# Patient Record
Sex: FEMALE | Race: WHITE | NOT HISPANIC OR LATINO | Employment: OTHER | URBAN - METROPOLITAN AREA
[De-identification: names, ages, dates, MRNs, and addresses within clinical notes are randomized per-mention and may not be internally consistent; named-entity substitution may affect disease eponyms.]

---

## 2017-01-27 ENCOUNTER — COMMUNICATION - HEALTHEAST (OUTPATIENT)
Dept: FAMILY MEDICINE | Facility: CLINIC | Age: 82
End: 2017-01-27

## 2017-03-03 ENCOUNTER — RECORDS - HEALTHEAST (OUTPATIENT)
Dept: ADMINISTRATIVE | Facility: OTHER | Age: 82
End: 2017-03-03

## 2017-03-03 PROCEDURE — 99285 EMERGENCY DEPT VISIT HI MDM: CPT | Mod: 25 | Performed by: STUDENT IN AN ORGANIZED HEALTH CARE EDUCATION/TRAINING PROGRAM

## 2017-03-03 PROCEDURE — 96361 HYDRATE IV INFUSION ADD-ON: CPT

## 2017-03-03 PROCEDURE — 96360 HYDRATION IV INFUSION INIT: CPT

## 2017-03-03 PROCEDURE — 93005 ELECTROCARDIOGRAM TRACING: CPT

## 2017-03-03 PROCEDURE — 99285 EMERGENCY DEPT VISIT HI MDM: CPT

## 2017-03-03 PROCEDURE — 93010 ELECTROCARDIOGRAM REPORT: CPT | Performed by: STUDENT IN AN ORGANIZED HEALTH CARE EDUCATION/TRAINING PROGRAM

## 2017-03-04 ENCOUNTER — APPOINTMENT (OUTPATIENT)
Dept: GENERAL RADIOLOGY | Facility: CLINIC | Age: 82
End: 2017-03-04
Attending: STUDENT IN AN ORGANIZED HEALTH CARE EDUCATION/TRAINING PROGRAM
Payer: COMMERCIAL

## 2017-03-04 ENCOUNTER — HOSPITAL ENCOUNTER (EMERGENCY)
Facility: CLINIC | Age: 82
Discharge: HOME | End: 2017-03-04
Attending: STUDENT IN AN ORGANIZED HEALTH CARE EDUCATION/TRAINING PROGRAM | Admitting: STUDENT IN AN ORGANIZED HEALTH CARE EDUCATION/TRAINING PROGRAM
Payer: COMMERCIAL

## 2017-03-04 VITALS
BODY MASS INDEX: 24.84 KG/M2 | HEIGHT: 62 IN | TEMPERATURE: 97.4 F | HEART RATE: 75 BPM | WEIGHT: 135 LBS | OXYGEN SATURATION: 95 % | DIASTOLIC BLOOD PRESSURE: 66 MMHG | SYSTOLIC BLOOD PRESSURE: 125 MMHG | RESPIRATION RATE: 12 BRPM

## 2017-03-04 DIAGNOSIS — R61 DIAPHORESIS: ICD-10-CM

## 2017-03-04 DIAGNOSIS — M62.81 GENERALIZED MUSCLE WEAKNESS: ICD-10-CM

## 2017-03-04 LAB
ALBUMIN SERPL-MCNC: 3.8 G/DL (ref 3.4–5)
ALBUMIN UR-MCNC: NEGATIVE MG/DL
ALP SERPL-CCNC: 84 U/L (ref 40–150)
ALT SERPL W P-5'-P-CCNC: 27 U/L (ref 0–50)
ANION GAP SERPL CALCULATED.3IONS-SCNC: 9 MMOL/L (ref 3–14)
APPEARANCE UR: CLEAR
AST SERPL W P-5'-P-CCNC: 22 U/L (ref 0–45)
BASOPHILS # BLD AUTO: 0 10E9/L (ref 0–0.2)
BASOPHILS NFR BLD AUTO: 0.3 %
BILIRUB SERPL-MCNC: 0.4 MG/DL (ref 0.2–1.3)
BILIRUB UR QL STRIP: NEGATIVE
BUN SERPL-MCNC: 10 MG/DL (ref 7–30)
CALCIUM SERPL-MCNC: 8.3 MG/DL (ref 8.5–10.1)
CHLORIDE SERPL-SCNC: 93 MMOL/L (ref 94–109)
CO2 SERPL-SCNC: 26 MMOL/L (ref 20–32)
COLOR UR AUTO: ABNORMAL
CREAT SERPL-MCNC: 0.59 MG/DL (ref 0.52–1.04)
DIFFERENTIAL METHOD BLD: NORMAL
EOSINOPHIL # BLD AUTO: 0.2 10E9/L (ref 0–0.7)
EOSINOPHIL NFR BLD AUTO: 3.1 %
ERYTHROCYTE [DISTWIDTH] IN BLOOD BY AUTOMATED COUNT: 12.1 % (ref 10–15)
GFR SERPL CREATININE-BSD FRML MDRD: ABNORMAL ML/MIN/1.7M2
GLUCOSE SERPL-MCNC: 76 MG/DL (ref 70–99)
GLUCOSE UR STRIP-MCNC: NEGATIVE MG/DL
HCT VFR BLD AUTO: 37.8 % (ref 35–47)
HGB BLD-MCNC: 12.8 G/DL (ref 11.7–15.7)
HGB UR QL STRIP: NEGATIVE
IMM GRANULOCYTES # BLD: 0 10E9/L (ref 0–0.4)
IMM GRANULOCYTES NFR BLD: 0.2 %
KETONES UR STRIP-MCNC: NEGATIVE MG/DL
LACTATE BLD-SCNC: 1 MMOL/L (ref 0.7–2.1)
LACTATE BLD-SCNC: 3.2 MMOL/L (ref 0.7–2.1)
LEUKOCYTE ESTERASE UR QL STRIP: NEGATIVE
LYMPHOCYTES # BLD AUTO: 3.3 10E9/L (ref 0.8–5.3)
LYMPHOCYTES NFR BLD AUTO: 52 %
MCH RBC QN AUTO: 29.2 PG (ref 26.5–33)
MCHC RBC AUTO-ENTMCNC: 33.9 G/DL (ref 31.5–36.5)
MCV RBC AUTO: 86 FL (ref 78–100)
MONOCYTES # BLD AUTO: 0.8 10E9/L (ref 0–1.3)
MONOCYTES NFR BLD AUTO: 13.2 %
NEUTROPHILS # BLD AUTO: 2 10E9/L (ref 1.6–8.3)
NEUTROPHILS NFR BLD AUTO: 31.2 %
NITRATE UR QL: NEGATIVE
PH UR STRIP: 6.5 PH (ref 5–7)
PLATELET # BLD AUTO: 234 10E9/L (ref 150–450)
POTASSIUM SERPL-SCNC: 3.2 MMOL/L (ref 3.4–5.3)
PROT SERPL-MCNC: 7 G/DL (ref 6.8–8.8)
RBC # BLD AUTO: 4.39 10E12/L (ref 3.8–5.2)
SODIUM SERPL-SCNC: 128 MMOL/L (ref 133–144)
SP GR UR STRIP: 1 (ref 1–1.03)
T4 FREE SERPL-MCNC: 1.03 NG/DL (ref 0.76–1.46)
TROPONIN I SERPL-MCNC: NORMAL UG/L (ref 0–0.04)
TROPONIN I SERPL-MCNC: NORMAL UG/L (ref 0–0.04)
TSH SERPL DL<=0.005 MIU/L-ACNC: 4.14 MU/L (ref 0.4–4)
URN SPEC COLLECT METH UR: ABNORMAL
UROBILINOGEN UR STRIP-MCNC: NORMAL MG/DL (ref 0–2)
WBC # BLD AUTO: 6.4 10E9/L (ref 4–11)

## 2017-03-04 PROCEDURE — 96361 HYDRATE IV INFUSION ADD-ON: CPT

## 2017-03-04 PROCEDURE — 25000132 ZZH RX MED GY IP 250 OP 250 PS 637: Mod: GY | Performed by: STUDENT IN AN ORGANIZED HEALTH CARE EDUCATION/TRAINING PROGRAM

## 2017-03-04 PROCEDURE — 71020 XR CHEST 2 VW: CPT

## 2017-03-04 PROCEDURE — 85025 COMPLETE CBC W/AUTO DIFF WBC: CPT | Performed by: STUDENT IN AN ORGANIZED HEALTH CARE EDUCATION/TRAINING PROGRAM

## 2017-03-04 PROCEDURE — 81003 URINALYSIS AUTO W/O SCOPE: CPT | Performed by: STUDENT IN AN ORGANIZED HEALTH CARE EDUCATION/TRAINING PROGRAM

## 2017-03-04 PROCEDURE — 84443 ASSAY THYROID STIM HORMONE: CPT | Performed by: STUDENT IN AN ORGANIZED HEALTH CARE EDUCATION/TRAINING PROGRAM

## 2017-03-04 PROCEDURE — 84484 ASSAY OF TROPONIN QUANT: CPT | Performed by: STUDENT IN AN ORGANIZED HEALTH CARE EDUCATION/TRAINING PROGRAM

## 2017-03-04 PROCEDURE — 25000128 H RX IP 250 OP 636: Performed by: STUDENT IN AN ORGANIZED HEALTH CARE EDUCATION/TRAINING PROGRAM

## 2017-03-04 PROCEDURE — 87086 URINE CULTURE/COLONY COUNT: CPT | Performed by: STUDENT IN AN ORGANIZED HEALTH CARE EDUCATION/TRAINING PROGRAM

## 2017-03-04 PROCEDURE — 80053 COMPREHEN METABOLIC PANEL: CPT | Performed by: STUDENT IN AN ORGANIZED HEALTH CARE EDUCATION/TRAINING PROGRAM

## 2017-03-04 PROCEDURE — 96360 HYDRATION IV INFUSION INIT: CPT

## 2017-03-04 PROCEDURE — 84439 ASSAY OF FREE THYROXINE: CPT | Performed by: STUDENT IN AN ORGANIZED HEALTH CARE EDUCATION/TRAINING PROGRAM

## 2017-03-04 PROCEDURE — 83605 ASSAY OF LACTIC ACID: CPT | Performed by: STUDENT IN AN ORGANIZED HEALTH CARE EDUCATION/TRAINING PROGRAM

## 2017-03-04 PROCEDURE — A9270 NON-COVERED ITEM OR SERVICE: HCPCS | Mod: GY | Performed by: STUDENT IN AN ORGANIZED HEALTH CARE EDUCATION/TRAINING PROGRAM

## 2017-03-04 RX ORDER — LIDOCAINE 40 MG/G
CREAM TOPICAL
Status: DISCONTINUED | OUTPATIENT
Start: 2017-03-04 | End: 2017-03-04 | Stop reason: HOSPADM

## 2017-03-04 RX ORDER — POTASSIUM CHLORIDE 1.5 G/1.58G
40 POWDER, FOR SOLUTION ORAL ONCE
Status: COMPLETED | OUTPATIENT
Start: 2017-03-04 | End: 2017-03-04

## 2017-03-04 RX ORDER — ASPIRIN 81 MG/1
162 TABLET, CHEWABLE ORAL ONCE
Status: COMPLETED | OUTPATIENT
Start: 2017-03-04 | End: 2017-03-04

## 2017-03-04 RX ADMIN — ASPIRIN 81 MG 162 MG: 81 TABLET ORAL at 02:04

## 2017-03-04 RX ADMIN — POTASSIUM CHLORIDE 40 MEQ: 1.5 POWDER, FOR SOLUTION ORAL at 02:05

## 2017-03-04 RX ADMIN — SODIUM CHLORIDE: 9 INJECTION, SOLUTION INTRAVENOUS at 02:05

## 2017-03-04 RX ADMIN — SODIUM CHLORIDE 1000 ML: 9 INJECTION, SOLUTION INTRAVENOUS at 00:28

## 2017-03-04 NOTE — PROGRESS NOTES
DATE:  3/4/2017   TIME OF RECEIPT FROM LAB:  0049  LAB TEST:  Lactic  LAB VALUE:  3.2  RESULTS GIVEN WITH READ-BACK TO (PROVIDER): Dr. Vidal  TIME LAB VALUE REPORTED TO PROVIDER:   0049

## 2017-03-04 NOTE — ED AVS SNAPSHOT
[facility] Emergency Department    [address] [address]    Phone:  [phone]    Fax:  [phone]                                       Polina Fernandez   MRN: [id number]    Department:  [facility] Emergency Department   Date of Visit:  3/3/2017           Patient Information     Date Of Birth          1935        Your diagnoses for this visit were:     Generalized muscle weakness     Diaphoresis        You were seen by Kahlil Vidal DO.      Follow-up Information     Follow up with Usha Johnson Schedule an appointment as soon as possible for a visit in 3 days.    Specialty:  Family Practice    Why:  Followup for reevaluation and managment plan.    Contact information:    [address] [address] [phone]        Discharge References/Attachments     WEAKNESS (UNCERTAIN CAUSE) (ENGLISH)      24 Hour Appointment Hotline       To make an appointment at any [facility], call [phone] ([phone]). If you don't have a family doctor or clinic, we will help you find one. [facility] are conveniently located to serve the needs of you and your family.             Review of your medicines      Our records show that you are taking the medicines listed below. If these are incorrect, please call your family doctor or clinic.        Dose / Directions Last dose taken    EVISTA 60 MG tablet   Generic drug:  raloxifene        1 TABLET ORALLY DAILY   Refills:  0        HYDROcodone-acetaminophen 5-325 MG per tablet   Commonly known as:  NORCO   Dose:  1-2 tablet   Quantity:  15 tablet        Take 1-2 tablets by mouth every 4 hours as needed for pain   Refills:  0        lovastatin 20 MG tablet   Commonly known as:  MEVACOR        1 TABLET ORALLY DAILY AT DINNER   Refills:  0        SYNTHROID 50 MCG tablet   Generic drug:  levothyroxine        1 TABLET ORALLY DAILY   Refills:  0        ZOLOFT 100 MG tablet   Generic  drug:  sertraline        1 TABLET ORALLY DAILY   Refills:  0                Procedures and tests performed during your visit     Procedure/Test Number of Times Performed    CBC with platelets differential 1    Cardiac Continuous Monitoring 1    Comprehensive metabolic panel 1    EKG 12 lead 1    Lactic acid whole blood 2    Peripheral IV catheter 1    Peripheral IV: Standard 1    Pulse oximetry nursing 1    T4 free 1    TSH with free T4 reflex 1    Troponin I 1    Troponin I (second draw) 1    UA reflex to Microscopic 1    Urine Culture Aerobic Bacterial 1    Vital signs 1    XR Chest 2 Views 1      Orders Needing Specimen Collection     None      Pending Results     Date and Time Order Name Status Description    3/4/2017 0014 Urine Culture Aerobic Bacterial In process     3/4/2017 0014 XR Chest 2 Views Preliminary             Pending Culture Results     Date and Time Order Name Status Description    3/4/2017 0014 Urine Culture Aerobic Bacterial In process              Test Results from your hospital stay     3/4/2017  1:04 AM - Interface, Flexilab Results      Component Results     Component Value Ref Range & Units Status    Sodium 128 (L) 133 - 144 mmol/L Final    Potassium 3.2 (L) 3.4 - 5.3 mmol/L Final    Chloride 93 (L) 94 - 109 mmol/L Final    Carbon Dioxide 26 20 - 32 mmol/L Final    Anion Gap 9 3 - 14 mmol/L Final    Glucose 76 70 - 99 mg/dL Final    Urea Nitrogen 10 7 - 30 mg/dL Final    Creatinine 0.59 0.52 - 1.04 mg/dL Final    GFR Estimate >90  Non  GFR Calc   >60 mL/min/1.7m2 Final    GFR Estimate If Black >90   GFR Calc   >60 mL/min/1.7m2 Final    Calcium 8.3 (L) 8.5 - 10.1 mg/dL Final    Bilirubin Total 0.4 0.2 - 1.3 mg/dL Final    Albumin 3.8 3.4 - 5.0 g/dL Final    Protein Total 7.0 6.8 - 8.8 g/dL Final    Alkaline Phosphatase 84 40 - 150 U/L Final    ALT 27 0 - 50 U/L Final    AST 22 0 - 45 U/L Final         3/4/2017 12:46 AM - Interface, Flexilab Results       Component Results     Component Value Ref Range & Units Status    WBC 6.4 4.0 - 11.0 10e9/L Final    RBC Count 4.39 3.8 - 5.2 10e12/L Final    Hemoglobin 12.8 11.7 - 15.7 g/dL Final    Hematocrit 37.8 35.0 - 47.0 % Final    MCV 86 78 - 100 fl Final    MCH 29.2 26.5 - 33.0 pg Final    MCHC 33.9 31.5 - 36.5 g/dL Final    RDW 12.1 10.0 - 15.0 % Final    Platelet Count 234 150 - 450 10e9/L Final    Diff Method Automated Method  Final    % Neutrophils 31.2 % Final    % Lymphocytes 52.0 % Final    % Monocytes 13.2 % Final    % Eosinophils 3.1 % Final    % Basophils 0.3 % Final    % Immature Granulocytes 0.2 % Final    Absolute Neutrophil 2.0 1.6 - 8.3 10e9/L Final    Absolute Lymphocytes 3.3 0.8 - 5.3 10e9/L Final    Absolute Monocytes 0.8 0.0 - 1.3 10e9/L Final    Absolute Eosinophils 0.2 0.0 - 0.7 10e9/L Final    Absolute Basophils 0.0 0.0 - 0.2 10e9/L Final    Abs Immature Granulocytes 0.0 0 - 0.4 10e9/L Final         3/4/2017  1:04 AM - Interface, Flexilab Results      Component Results     Component Value Ref Range & Units Status    Troponin I ES  0.000 - 0.045 ug/L Final    <0.015  The 99th percentile for upper reference range is 0.045 ug/L.  Troponin values in   the range of 0.045 - 0.120 ug/L may be associated with risks of adverse   clinical events.           3/4/2017  1:19 AM - Interface, Radiant Ib      Narrative     CHEST TWO VIEWS  3/4/2017 12:50 AM     HISTORY: Chest pain.    COMPARISON: None.        Impression     IMPRESSION: Aortic calcification. Chest otherwise negative. Lungs are  clear.         3/4/2017  1:09 AM - Interface, Flexilab Results      Component Results     Component Value Ref Range & Units Status    TSH 4.14 (H) 0.40 - 4.00 mU/L Final         3/4/2017  1:19 AM - Interface, Flexilab Results         3/4/2017  1:25 AM - Interface, Flexilab Results      Component Results     Component Value Ref Range & Units Status    Color Urine Light Yellow  Final    Appearance Urine Clear  Final    Glucose  Urine Negative NEG mg/dL Final    Bilirubin Urine Negative NEG Final    Ketones Urine Negative NEG mg/dL Final    Specific Gravity Urine 1.001 (L) 1.003 - 1.035 Final    Blood Urine Negative NEG Final    pH Urine 6.5 5.0 - 7.0 pH Final    Protein Albumin Urine Negative NEG mg/dL Final    Urobilinogen mg/dL Normal 0.0 - 2.0 mg/dL Final    Nitrite Urine Negative NEG Final    Leukocyte Esterase Urine Negative NEG Final    Source Midstream Urine  Final         3/4/2017 12:49 AM - Interface, Flexilab Results      Component Results     Component Value Ref Range & Units Status    Lactic Acid 3.2 (H) 0.7 - 2.1 mmol/L Final    Significant value called to and read back by  RENETTA NAVA 3/4/17 AT 0048 BY WILLY           3/4/2017  1:22 AM - Interface, Flexilab Results      Component Results     Component Value Ref Range & Units Status    T4 Free 1.03 0.76 - 1.46 ng/dL Final         3/4/2017  3:23 AM - Interface, Flexilab Results      Component Results     Component Value Ref Range & Units Status    Troponin I ES  0.000 - 0.045 ug/L Final    <0.015  The 99th percentile for upper reference range is 0.045 ug/L.  Troponin values in   the range of 0.045 - 0.120 ug/L may be associated with risks of adverse   clinical events.           3/4/2017  3:09 AM - Interface, Flexilab Results      Component Results     Component Value Ref Range & Units Status    Lactic Acid 1.0 0.7 - 2.1 mmol/L Final                Thank you for choosing [facility]       Thank you for choosing [facility] for your care. Our goal is always to provide you with excellent care. Hearing back from our patients is one way we can continue to improve our services. Please take a few minutes to complete the written survey that you may receive in the mail after you visit with us. Thank you!        [employer] Information     [employer] lets you send messages to your doctor, view your test results, renew your prescriptions, schedule appointments and more. To sign up, go to  [email or web address] . Click on \"Log in\" on the left side of the screen, which will take you to the Welcome page. Then click on \"Sign up Now\" on the right side of the page.     You will be asked to enter the access code listed below, as well as some personal information. Please follow the directions to create your username and password.     Your access code is: [email or web address]  Expires: 2017  1:52 AM     Your access code will  in 90 days. If you need help or a new code, please call your [place] clinic or [phone].        Care EveryWhere ID     This is your Care EveryWhere ID. This could be used by other organizations to access your [place] medical records  [id number]        After Visit Summary       This is your record. Keep this with you and show to your community pharmacist(s) and doctor(s) at your next visit.                  "

## 2017-03-04 NOTE — ED NOTES
Patient  Presents after working very hard trying to get her house ready for sell Patient lives and cares for her son who is handicapped  Patient has Mycenaean . Patient seems to be very dehydrated she has not been taking fluids while she was working all day

## 2017-03-04 NOTE — ED AVS SNAPSHOT
[facility] Emergency Department    [address] [address]    Phone:  [phone]    Fax:  [phone]                                       Polina Fernandez   MRN: [id number]    Department:  [facility] Emergency Department   Date of Visit:  3/3/2017           After Visit Summary Signature Page     I have received my discharge instructions, and my questions have been answered. I have discussed any challenges I see with this plan with the nurse or doctor.    ..........................................................................................................................................  Patient/Patient Representative Signature      ..........................................................................................................................................  Patient Representative Print Name and Relationship to Patient    ..................................................               ................................................  Date                                            Time    ..........................................................................................................................................  Reviewed by Signature/Title    ...................................................              ..............................................  Date                                                            Time

## 2017-03-04 NOTE — ED PROVIDER NOTES
History     Chief Complaint   Patient presents with     Fatigue     HPI  Polina Fernandez is a 82 year old female with past medical history which include hypertension and osteoarthritis who presents for evaluation of episode of generalized weakness and diaphoresis at 11 PM. Patient explains that she was quite active throughout the day but feeling well, she was sitting in front of the television and playing on her computer for a couple hours prior to sudden onset of diaphoresis and the generalized weakness. She admits that the sweating resolved when she got into her car to come to the department, but in the department she still feels that she is suffering from generalized weakness. She denies recent fevers/chills, chest pain, shortness of breath, cough, hemoptysis, abdominal pain, vomiting, diarrhea, or genitourinary symptoms.    I have reviewed the Medications, Allergies, Past Medical and Surgical History, and Social History in the Epic system.    There is no problem list on file for this patient.      No past surgical history on file.    Social History     Social History     Marital status:      Spouse name: N/A     Number of children: N/A     Years of education: N/A     Occupational History     Not on file.     Social History Main Topics     Smoking status: Never Smoker     Smokeless tobacco: Not on file     Alcohol use No     Drug use: No     Sexual activity: Not on file     Other Topics Concern     Not on file     Social History Narrative       No family history on file.    Most Recent Immunizations   Administered Date(s) Administered     TDAP (ADACEL AGES 11-64) 12/18/2015       Review of Systems  Constitutional: Positive for sudden onset of generalized weakness with diaphoresis. Negative for fever or chills.  Eye: Negative for visual changes from baseline.  Respiratory: Negative for cough or shortness of breath.  Cardiovascular: Negative for chest pain.  Gastrointestinal: Negative for abdominal  "pain, vomiting, or diarrhea. Denies blood with bowel movements.  Genitourinary: Negative for dysuria.  Musculoskeletal: Negative for back pain or recent injuries.  Neurological: Negative for headache, dizziness, sensory deficits or focal weakness.    All others reviewed and are negative.      Physical Exam   BP: 151/80  Pulse: 75  Temp: 97.4  F (36.3  C)  Resp: 18  Height: 157.5 cm (5' 2\")  Weight: 61.2 kg (135 lb)  SpO2: 99 %  Physical Exam  Constitutional: Well developed, well nourished. Appears nontoxic and in no acute distress.   Head: Normocephalic and atraumatic. Symmetrical in appearance.  Eyes: Conjunctivae are normal.  Neck: Neck supple.  Cardiovascular: No cyanosis. RRR. No audible murmurs noted. No lower extremity edema or asymmetry.  Respiratory: Effort normal, no respiratory distress. CTAB without diminished regions. No wheezing, rhonchi, or crackles.  Gastrointestinal: Soft, nondistended abdomen. Nontender and without guarding. No rigidity or rebound tenderness. Negative McBurney's point. Negative for Hanks's sign. No palpable pulsatile mass.  Musculoskeletal: Moves all extremities spontaneously and without complaint.  Neuro: Patient is alert and oriented.  Skin: Skin is warm and dry, not diaphoretic.  Psych: Appears to have a normal mood and affect.      ED Course     ED Course     Procedures               EKG Interpretation:      Interpreted by: Kahlil Vidal  Time reviewed: Upon arrival     Symptoms at time of EKG: Mild generalized weakness  Rhythm: Sinus  Rate: Normal  Conduction: None atypical   ST Segments/ T Waves: No pathologic ST-elevations or T-wave abnormalities.  Q Waves: None  Comparison to prior: Similar morphology to previous     Clinical Impression: No sign of ischemia         Critical Care time:  none               Results for orders placed or performed during the hospital encounter of 03/04/17 (from the past 24 hour(s))   Comprehensive metabolic panel   Result Value Ref Range    " Sodium 128 (L) 133 - 144 mmol/L    Potassium 3.2 (L) 3.4 - 5.3 mmol/L    Chloride 93 (L) 94 - 109 mmol/L    Carbon Dioxide 26 20 - 32 mmol/L    Anion Gap 9 3 - 14 mmol/L    Glucose 76 70 - 99 mg/dL    Urea Nitrogen 10 7 - 30 mg/dL    Creatinine 0.59 0.52 - 1.04 mg/dL    GFR Estimate >90  Non  GFR Calc   >60 mL/min/1.7m2    GFR Estimate If Black >90   GFR Calc   >60 mL/min/1.7m2    Calcium 8.3 (L) 8.5 - 10.1 mg/dL    Bilirubin Total 0.4 0.2 - 1.3 mg/dL    Albumin 3.8 3.4 - 5.0 g/dL    Protein Total 7.0 6.8 - 8.8 g/dL    Alkaline Phosphatase 84 40 - 150 U/L    ALT 27 0 - 50 U/L    AST 22 0 - 45 U/L   CBC with platelets differential   Result Value Ref Range    WBC 6.4 4.0 - 11.0 10e9/L    RBC Count 4.39 3.8 - 5.2 10e12/L    Hemoglobin 12.8 11.7 - 15.7 g/dL    Hematocrit 37.8 35.0 - 47.0 %    MCV 86 78 - 100 fl    MCH 29.2 26.5 - 33.0 pg    MCHC 33.9 31.5 - 36.5 g/dL    RDW 12.1 10.0 - 15.0 %    Platelet Count 234 150 - 450 10e9/L    Diff Method Automated Method     % Neutrophils 31.2 %    % Lymphocytes 52.0 %    % Monocytes 13.2 %    % Eosinophils 3.1 %    % Basophils 0.3 %    % Immature Granulocytes 0.2 %    Absolute Neutrophil 2.0 1.6 - 8.3 10e9/L    Absolute Lymphocytes 3.3 0.8 - 5.3 10e9/L    Absolute Monocytes 0.8 0.0 - 1.3 10e9/L    Absolute Eosinophils 0.2 0.0 - 0.7 10e9/L    Absolute Basophils 0.0 0.0 - 0.2 10e9/L    Abs Immature Granulocytes 0.0 0 - 0.4 10e9/L   Troponin I   Result Value Ref Range    Troponin I ES  0.000 - 0.045 ug/L     <0.015  The 99th percentile for upper reference range is 0.045 ug/L.  Troponin values in   the range of 0.045 - 0.120 ug/L may be associated with risks of adverse   clinical events.     TSH with free T4 reflex   Result Value Ref Range    TSH 4.14 (H) 0.40 - 4.00 mU/L   Lactic acid whole blood   Result Value Ref Range    Lactic Acid 3.2 (H) 0.7 - 2.1 mmol/L   T4 free   Result Value Ref Range    T4 Free 1.03 0.76 - 1.46 ng/dL   XR Chest 2 Views     "Narrative    CHEST TWO VIEWS  3/4/2017 12:50 AM     HISTORY: Chest pain.    COMPARISON: None.      Impression    IMPRESSION: Aortic calcification. Chest otherwise negative. Lungs are  clear.   UA reflex to Microscopic   Result Value Ref Range    Color Urine Light Yellow     Appearance Urine Clear     Glucose Urine Negative NEG mg/dL    Bilirubin Urine Negative NEG    Ketones Urine Negative NEG mg/dL    Specific Gravity Urine 1.001 (L) 1.003 - 1.035    Blood Urine Negative NEG    pH Urine 6.5 5.0 - 7.0 pH    Protein Albumin Urine Negative NEG mg/dL    Urobilinogen mg/dL Normal 0.0 - 2.0 mg/dL    Nitrite Urine Negative NEG    Leukocyte Esterase Urine Negative NEG    Source Midstream Urine    Troponin I (second draw)   Result Value Ref Range    Troponin I ES  0.000 - 0.045 ug/L     <0.015  The 99th percentile for upper reference range is 0.045 ug/L.  Troponin values in   the range of 0.045 - 0.120 ug/L may be associated with risks of adverse   clinical events.     Lactic acid whole blood   Result Value Ref Range    Lactic Acid 1.0 0.7 - 2.1 mmol/L         Assessments & Plan (with Medical Decision Making)   Polina Fernandez is a 82 year old female who presents to the department for evaluation after episode of generalized weakness and diaphoresis while sitting in her \"television room\". She had no chest pain today, EKG morphology similar to previous, and troponin within reference range ×2. She is also without signs/symptoms of infectious process. CBC without leukocytosis or anemia. Her symptoms steadily improved during stay in the department with IV fluid hydration. She admits that she has not been drinking fluid as much as she should lately and spent a significant amount of time and energy cleaning her house earlier in the day. Nurse had initially put orders in including lactate which was elevated but improved during stay. Her symptoms were not associated with lightheadedness or near syncope she says.    Clinical " impression is the patient had an episode of diaphoresis and generalized weakness which readily improve, however etiology remains unknown. She was monitored in the department for >4 hours without recurrence or identifiable arrhythmia via cardiac monitor. Recommend continued monitoring at home as well as scheduling follow-up with her primary provider for reevaluation. Prior to discharge, I made it clear that illness can unexpectedly develop/progress so she has been instructed to return to the emergency department for reevaluation of evolving symptoms, chest pain, shortness of breath, lightheadedness, or other concerns. She seems comfortable with the discharge plan we discussed including follow up.    Disclaimer: This note consists of symbols derived from keyboarding, dictation, and/or voice recognition software. As a result, there may be errors in the script that have gone undetected.  Please consider this when interpreting information found in the chart.        I have reviewed the nursing notes.    I have reviewed the findings, diagnosis, plan and need for follow up with the patient.    New Prescriptions    No medications on file       Final diagnoses:   Generalized muscle weakness   Diaphoresis       3/3/2017   [facility] EMERGENCY DEPARTMENT     Kahlil Vidal DO  03/04/17 0340

## 2017-03-06 LAB
BACTERIA SPEC CULT: NORMAL
MICRO REPORT STATUS: NORMAL
SPECIMEN SOURCE: NORMAL

## 2017-08-21 ENCOUNTER — COMMUNICATION - HEALTHEAST (OUTPATIENT)
Dept: FAMILY MEDICINE | Facility: CLINIC | Age: 82
End: 2017-08-21

## 2017-08-21 DIAGNOSIS — I10 HTN (HYPERTENSION): ICD-10-CM

## 2017-08-23 ENCOUNTER — AMBULATORY - HEALTHEAST (OUTPATIENT)
Dept: FAMILY MEDICINE | Facility: CLINIC | Age: 82
End: 2017-08-23

## 2017-09-18 ENCOUNTER — OFFICE VISIT - HEALTHEAST (OUTPATIENT)
Dept: FAMILY MEDICINE | Facility: CLINIC | Age: 82
End: 2017-09-18

## 2017-09-18 ENCOUNTER — COMMUNICATION - HEALTHEAST (OUTPATIENT)
Dept: FAMILY MEDICINE | Facility: CLINIC | Age: 82
End: 2017-09-18

## 2017-09-18 DIAGNOSIS — M94.9 DISORDER OF BONE AND CARTILAGE: ICD-10-CM

## 2017-09-18 DIAGNOSIS — F34.1 DYSTHYMIC DISORDER: ICD-10-CM

## 2017-09-18 DIAGNOSIS — I10 HTN (HYPERTENSION): ICD-10-CM

## 2017-09-18 DIAGNOSIS — E03.9 HYPOTHYROID: ICD-10-CM

## 2017-09-18 DIAGNOSIS — Z00.00 HEALTH CARE MAINTENANCE: ICD-10-CM

## 2017-09-18 DIAGNOSIS — M77.42 METATARSALGIA OF BOTH FEET: ICD-10-CM

## 2017-09-18 DIAGNOSIS — M77.41 METATARSALGIA OF BOTH FEET: ICD-10-CM

## 2017-09-18 DIAGNOSIS — M89.9 DISORDER OF BONE AND CARTILAGE: ICD-10-CM

## 2017-09-18 DIAGNOSIS — E78.00 HYPERCHOLESTEREMIA: ICD-10-CM

## 2017-09-18 LAB
CHOLEST SERPL-MCNC: 195 MG/DL
FASTING STATUS PATIENT QL REPORTED: YES
HDLC SERPL-MCNC: 67 MG/DL
LDLC SERPL CALC-MCNC: 115 MG/DL
TRIGL SERPL-MCNC: 64 MG/DL

## 2017-09-18 ASSESSMENT — MIFFLIN-ST. JEOR: SCORE: 1006.17

## 2017-10-09 ENCOUNTER — AMBULATORY - HEALTHEAST (OUTPATIENT)
Dept: NURSING | Facility: CLINIC | Age: 82
End: 2017-10-09

## 2017-10-09 DIAGNOSIS — I10 ESSENTIAL HYPERTENSION: ICD-10-CM

## 2017-10-25 ENCOUNTER — HOSPITAL ENCOUNTER (OUTPATIENT)
Dept: MAMMOGRAPHY | Facility: HOSPITAL | Age: 82
Discharge: HOME | End: 2017-10-25
Attending: FAMILY MEDICINE

## 2017-10-25 DIAGNOSIS — Z00.00 HEALTH CARE MAINTENANCE: ICD-10-CM

## 2017-11-15 ENCOUNTER — COMMUNICATION - HEALTHEAST (OUTPATIENT)
Dept: FAMILY MEDICINE | Facility: CLINIC | Age: 82
End: 2017-11-15

## 2017-11-15 DIAGNOSIS — E78.00 HYPERCHOLESTEREMIA: ICD-10-CM

## 2017-11-16 ENCOUNTER — COMMUNICATION - HEALTHEAST (OUTPATIENT)
Dept: FAMILY MEDICINE | Facility: CLINIC | Age: 82
End: 2017-11-16

## 2017-11-16 DIAGNOSIS — F34.1 DYSTHYMIC DISORDER: ICD-10-CM

## 2017-11-18 ENCOUNTER — COMMUNICATION - HEALTHEAST (OUTPATIENT)
Dept: FAMILY MEDICINE | Facility: CLINIC | Age: 82
End: 2017-11-18

## 2017-11-18 DIAGNOSIS — E78.00 HYPERCHOLESTEREMIA: ICD-10-CM

## 2017-11-18 DIAGNOSIS — F34.1 DYSTHYMIC DISORDER: ICD-10-CM

## 2017-11-23 ENCOUNTER — HOSPITAL ENCOUNTER (EMERGENCY)
Facility: CLINIC | Age: 82
Discharge: HOME | End: 2017-11-24
Attending: EMERGENCY MEDICINE | Admitting: EMERGENCY MEDICINE
Payer: COMMERCIAL

## 2017-11-23 ENCOUNTER — APPOINTMENT (OUTPATIENT)
Dept: CT IMAGING | Facility: CLINIC | Age: 82
End: 2017-11-23
Attending: EMERGENCY MEDICINE
Payer: COMMERCIAL

## 2017-11-23 ENCOUNTER — APPOINTMENT (OUTPATIENT)
Dept: GENERAL RADIOLOGY | Facility: CLINIC | Age: 82
End: 2017-11-23
Attending: EMERGENCY MEDICINE
Payer: COMMERCIAL

## 2017-11-23 DIAGNOSIS — S52.201A RADIUS/ULNA FRACTURE, RIGHT, CLOSED, INITIAL ENCOUNTER: ICD-10-CM

## 2017-11-23 DIAGNOSIS — M79.641 RIGHT HAND PAIN: ICD-10-CM

## 2017-11-23 DIAGNOSIS — M25.571 PAIN IN JOINT, ANKLE AND FOOT, RIGHT: ICD-10-CM
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
## 2017-11-23 DIAGNOSIS — S52.501A CLOSED FRACTURE OF RIGHT DISTAL RADIUS: ICD-10-CM

## 2017-11-23 DIAGNOSIS — S09.90XA CLOSED HEAD INJURY, INITIAL ENCOUNTER: ICD-10-CM
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
## 2017-11-23 DIAGNOSIS — S52.91XA RADIUS/ULNA FRACTURE, RIGHT, CLOSED, INITIAL ENCOUNTER: ICD-10-CM

## 2017-11-23 DIAGNOSIS — S80.01XA TRAUMATIC ECCHYMOSIS OF RIGHT KNEE, INITIAL ENCOUNTER: ICD-10-CM

## 2017-11-23 PROCEDURE — 99284 EMERGENCY DEPT VISIT MOD MDM: CPT | Mod: 25 | Performed by: EMERGENCY MEDICINE

## 2017-11-23 PROCEDURE — 73562 X-RAY EXAM OF KNEE 3: CPT | Mod: RT

## 2017-11-23 PROCEDURE — 72125 CT NECK SPINE W/O DYE: CPT

## 2017-11-23 PROCEDURE — 25600 CLTX DST RDL FX/EPHYS SEP WO: CPT | Mod: 54 | Performed by: EMERGENCY MEDICINE

## 2017-11-23 PROCEDURE — 25600 CLTX DST RDL FX/EPHYS SEP WO: CPT

## 2017-11-23 PROCEDURE — 99284 EMERGENCY DEPT VISIT MOD MDM: CPT | Mod: 25

## 2017-11-23 PROCEDURE — 73110 X-RAY EXAM OF WRIST: CPT | Mod: RT

## 2017-11-23 PROCEDURE — 70450 CT HEAD/BRAIN W/O DYE: CPT

## 2017-11-23 RX ORDER — ASPIRIN 81 MG/1
81 TABLET, CHEWABLE ORAL DAILY
COMMUNITY
End: 2023-07-25

## 2017-11-23 NOTE — ED AVS SNAPSHOT
[facility] Emergency Department    [address] [address]    Phone:  [phone]    Fax:  [phone]                                       Polina Fernandez   MRN: [id number]    Department:  [facility] Emergency Department   Date of Visit:  11/23/2017           After Visit Summary Signature Page     I have received my discharge instructions, and my questions have been answered. I have discussed any challenges I see with this plan with the nurse or doctor.    ..........................................................................................................................................  Patient/Patient Representative Signature      ..........................................................................................................................................  Patient Representative Print Name and Relationship to Patient    ..................................................               ................................................  Date                                            Time    ..........................................................................................................................................  Reviewed by Signature/Title    ...................................................              ..............................................  Date                                                            Time

## 2017-11-23 NOTE — ED AVS SNAPSHOT
[facility] Emergency Department    [address] [address]    Phone:  [phone]    Fax:  [phone]                                       Polina Fernandez   MRN: [id number]    Department:  [facility] Emergency Department   Date of Visit:  11/23/2017           Patient Information     Date Of Birth          1935        Your diagnoses for this visit were:     Fall, initial encounter     Radius/ulna fracture, right, closed, initial encounter     Traumatic ecchymosis of right knee, initial encounter     Pain in joint, ankle and foot, right     Closed head injury, initial encounter     Right hand pain        You were seen by Abdulaziz Corona MD.        Discharge Instructions       Use rest, ice, elevation to help with your pain.  Use acetaminophen and ibuprofen as needed.  Follow-up in orthopedic surgery clinic for a recheck.  Return to the emergency department if you have worsening symptoms, difficulty breathing, or other concerns.    24 Hour Appointment Hotline       To make an appointment at any [facility] clinic, call [phone] ([phone]). If you don't have a family doctor or clinic, we will help you find one. [facility] clinics are conveniently located to serve the needs of you and your family.          ED Discharge Orders     ORTHO  REFERRAL       [facility] is referring you to the Orthopedic  Services at [facility].       The  Representative will assist you in the coordination of your Orthopedic and Musculoskeletal Care as prescribed by your physician.    The  Representative will call you within 1 business day to help schedule your appointment, or you may contact the  Representative at:    All areas ~ [phone]     Type of Referral : Surgical / Specialist       Timeframe requested: 3 - 5 days    Coverage of these services is subject to the terms and limitations of your  health insurance plan.  Please call member services at your health plan with any benefit or coverage questions.      If X-rays, CT or MRI's have been performed, please contact the facility where they were done to arrange for , prior to your scheduled appointment.  Please bring this referral request to your appointment and present it to your specialist.                     Review of your medicines      Our records show that you are taking the medicines listed below. If these are incorrect, please call your family doctor or clinic.        Dose / Directions Last dose taken    aspirin 81 MG chewable tablet   Dose:  81 mg        Take 81 mg by mouth daily   Refills:  0        EVISTA 60 MG tablet   Generic drug:  raloxifene        1 TABLET ORALLY DAILY   Refills:  0        HYDROcodone-acetaminophen 5-325 MG per tablet   Commonly known as:  NORCO   Dose:  1-2 tablet   Quantity:  15 tablet        Take 1-2 tablets by mouth every 4 hours as needed for pain   Refills:  0        lovastatin 20 MG tablet   Commonly known as:  MEVACOR        1 TABLET ORALLY DAILY AT DINNER   Refills:  0        SYNTHROID 50 MCG tablet   Generic drug:  levothyroxine        1 TABLET ORALLY DAILY   Refills:  0        ZOLOFT 100 MG tablet   Generic drug:  sertraline        1 TABLET ORALLY DAILY   Refills:  0                Procedures and tests performed during your visit     Ankle XR, G/E 3 views, right    CT Head w/o Contrast    Cervical spine CT w/o contrast    Fingers XR, 2-3 views, right    Knee XR, 3 views, right    Wrist XR, G/E 3 views, right    XR Chest 1 View      Orders Needing Specimen Collection     None      Pending Results     No orders found for last 3 day(s).            Pending Culture Results     No orders found for last 3 day(s).            Pending Results Instructions     If you had any lab results that were not finalized at the time of your Discharge, you can call the ED Lab Result RN at [phone]. You will be contacted by  this team for any positive Lab results or changes in treatment. The nurses are available 7 days a week from 10A to 6:30P.  You can leave a message 24 hours per day and they will return your call.        Test Results From Your Hospital Stay        11/24/2017  1:44 AM      Narrative     CT HEAD W/O CONTRAST  11/24/2017 12:01 AM     HISTORY: Fall, closed head injury.     TECHNIQUE: Axial images of the head and coronal reformations without  IV contrast material. Radiation dose for this scan was reduced using  automated exposure control, adjustment of the mA and/or kV according  to patient size, or iterative reconstruction technique.    COMPARISON: None.    FINDINGS: Moderate-sized right anterior scalp hematoma. No  intracranial hemorrhage, mass or mass effect. No acute infarct  identified. No shift of midline structures. No skull fractures.        Impression     IMPRESSION:   1. Right anterior scalp hematoma. No skull fractures.  2. No acute intracranial findings.    KIRK URIBE MD         11/24/2017  1:43 AM      Narrative     CT CERVICAL SPINE W/O CONTRAST  11/24/2017 12:02 AM     HISTORY: Fall from standing, closed head injury.    TECHNIQUE: Axial images through cervical spine without contrast.  Sagittal and coronal reformatted images. Radiation dose for this scan  was reduced using automated exposure control, adjustment of the mA  and/or kV according to patient size, or iterative reconstruction  technique.    COMPARISON: None.    FINDINGS: No acute fractures. Loss of normal cervical lordosis with  mild mid cervical kyphosis. Slight curve convex to the left. Advanced  degenerative changes with multilevel degenerative disc disease and  extensive degenerative facet arthropathy at multiple levels throughout  the cervical spine, greater on the right. Minimal anterior subluxation  of C5 on C6. No significant central canal narrowing.        Impression     IMPRESSION:  1. No fractures or other acute findings.  2.  Advanced degenerative changes.    KIRK URIBE MD         11/23/2017 11:55 PM      Narrative     XR WRIST RT G/E 3 VW  11/23/2017 11:40 PM     INDICATION: Fall onto outstretched hand.    COMPARISON: None.        Impression     IMPRESSION: Mildly displaced and compressed distal right radial  fracture. The fracture extends into the radiocarpal joint space and  there is slight dorsal displacement and compression of the distal  fracture fragment. Advanced degenerative changes at the base of the  thumb and STT joints. Rounded lucency in the scaphoid bone is likely a  degenerative cyst. Degenerative changes involving IP joints of the  hand.    KIRK URIBE MD         11/24/2017  1:44 AM      Narrative     XR KNEE RT 3 VW  11/23/2017 11:48 PM     INDICATION: Fall, ecchymosis and swelling over patella, full range of  motion.    COMPARISON: 12/5/2013.        Impression     IMPRESSION: No acute fractures. Degenerative arthritis involving all  compartments of the right knee, most prominent at the patellofemoral  joint space, increased since the prior study. Again seen is  calcification within the suprapatellar bursa. Chondrocalcinosis  involving medial and lateral compartments. Prominent osteophytic  spurring arising from the upper patella.    KIRK URIBE MD         11/24/2017  1:42 AM      Narrative     XR CHEST 1 VW   11/24/2017 1:30 AM     INDICATION: Fall with right-sided chest pain. No crepitus.    COMPARISON: 3/4/2017.        Impression     IMPRESSION: No infiltrates or other acute findings. Normal-sized  cardiac silhouette. Aortic calcification. Minimal stable blunting of  the right costophrenic angle. No pneumothorax.     KIRK URIBE MD         11/24/2017  2:01 AM      Narrative     XR FINGER RT G/E 2 VW  11/24/2017 1:30 AM     INDICATION: Ecchymosis and swelling at the MCP joint.    COMPARISON: None.        Impression     IMPRESSION: Soft tissue prominence about the ulnar aspect of the hand  near  "the fifth MCP joint. No acute fractures. Degenerative changes at  the IP joints of the fifth finger.    KIRK URIBE MD         2017  2:01 AM      Narrative     XR ANKLE RT G/E 3 VW  2017 1:30 AM     INDICATION: Fall, pain and ecchymosis just anterior to the lateral  malleolus.    COMPARISON: 2013.        Impression     IMPRESSION: No acute fractures. The ankle mortise is symmetric. Small  bony density along the upper aspect of the anterior talus is likely  due to degenerative change or old trauma. Small plantar calcaneal  spur.    KIRK URIBE MD                Thank you for choosing [facility]       Thank you for choosing [facility] for your care. Our goal is always to provide you with excellent care. Hearing back from our patients is one way we can continue to improve our services. Please take a few minutes to complete the written survey that you may receive in the mail after you visit with us. Thank you!        [employer] Information     [employer] lets you send messages to your doctor, view your test results, renew your prescriptions, schedule appointments and more. To sign up, go to [email or web address] . Click on \"Log in\" on the left side of the screen, which will take you to the Welcome page. Then click on \"Sign up Now\" on the right side of the page.     You will be asked to enter the access code listed below, as well as some personal information. Please follow the directions to create your username and password.     Your access code is: [email or web address]  Expires: 2018  2:14 AM     Your access code will  in 90 days. If you need help or a new code, please call your [facility] clinic or [phone].        Care EveryWhere ID     This is your Care EveryWhere ID. This could be used by other organizations to access your [facility] medical records  [id number]        Equal Access to Services     DANIA HA AH: frank Melvin, pippa rae " elba jorge ah. So [facility] [phone].    ATENCIÓN: Si habla español, tiene a bruner disposición servicios gratuitos de asistencia lingüística. Llame al [phone].    We comply with applicable federal civil rights laws and [place] laws. We do not discriminate on the basis of race, color, national origin, age, disability, sex, sexual orientation, or gender identity.            After Visit Summary       This is your record. Keep this with you and show to your community pharmacist(s) and doctor(s) at your next visit.

## 2017-11-24 ENCOUNTER — RECORDS - HEALTHEAST (OUTPATIENT)
Dept: ADMINISTRATIVE | Facility: OTHER | Age: 82
End: 2017-11-24

## 2017-11-24 ENCOUNTER — APPOINTMENT (OUTPATIENT)
Dept: GENERAL RADIOLOGY | Facility: CLINIC | Age: 82
End: 2017-11-24
Attending: EMERGENCY MEDICINE
Payer: COMMERCIAL

## 2017-11-24 VITALS
BODY MASS INDEX: 24.48 KG/M2 | OXYGEN SATURATION: 93 % | SYSTOLIC BLOOD PRESSURE: 151 MMHG | RESPIRATION RATE: 18 BRPM | HEART RATE: 79 BPM | WEIGHT: 133 LBS | DIASTOLIC BLOOD PRESSURE: 83 MMHG | TEMPERATURE: 97.7 F | HEIGHT: 62 IN

## 2017-11-24 PROCEDURE — 73140 X-RAY EXAM OF FINGER(S): CPT | Mod: RT

## 2017-11-24 PROCEDURE — 25000132 ZZH RX MED GY IP 250 OP 250 PS 637: Performed by: EMERGENCY MEDICINE

## 2017-11-24 PROCEDURE — 73610 X-RAY EXAM OF ANKLE: CPT | Mod: RT

## 2017-11-24 PROCEDURE — 71010 XR CHEST 1 VW: CPT

## 2017-11-24 RX ORDER — ACETAMINOPHEN 325 MG/1
650 TABLET ORAL ONCE
Status: COMPLETED | OUTPATIENT
Start: 2017-11-24 | End: 2017-11-24

## 2017-11-24 RX ADMIN — ACETAMINOPHEN 650 MG: 325 TABLET, FILM COATED ORAL at 01:37

## 2017-11-24 NOTE — ED NOTES
Pt assisted to/from bathroom to void. Pt does with with w/c escort and SBA. Pt reports no headache. Pt declined intervention of PRN pain medications for wrist discomfort. Pt assisted back to bed, ice packs applied to wrist and forehead hematoma.  Pt does reports some tenderness in right side of chest, reproducible with palpation. MD updated.

## 2017-11-24 NOTE — ED NOTES
Pt states around 2230 she was walking, missed a step when leaving daughters house. Pt fell on cement. Pt reports her right wrist is painful to move. Pt states her head hit sidewalk. Pt denies LOC, nausea, vomitting or headache. Pt denies taking blood thinners. Pt currently A/O x4. PERLUDMILA.

## 2017-11-24 NOTE — ED PROVIDER NOTES
"  History     Chief Complaint   Patient presents with     Fall     mechanical, missed step. no LOC. no N.V.headache,. no blood thinners     Head Injury     large bump above right eye, no active bleeding     Wrist Pain     right     HPI  Polina Fernandez is a 82 year old female who presents for evaluation after a fall.  The patient was walking leaving her daughter's house when she missed a step and fell forward landing on her forehead, right knee, and right wrist.  No loss of consciousness, nausea, vomiting, or headache.  She is on aspirin but no other blood thinners.  She was able to get up and walk after the injury.  She has not taking anything for the pain.  Pain is worst in the right wrist, aching, moderate in severity.  She denies shortness of breath, chest pain, abdominal pain, or focal numbness or weakness.    Problem List:    There are no active problems to display for this patient.       Past Medical History:    Past Medical History:   Diagnosis Date     Hypercholesteremia      Hypertension        Past Surgical History:    No past surgical history on file.    Family History:    No family history on file.    Social History:  Marital Status:   [2]  Social History   Substance Use Topics     Smoking status: Never Smoker     Smokeless tobacco: Never Used     Alcohol use No        Medications:      aspirin 81 MG chewable tablet   ZOLOFT 100 MG OR TABS   SYNTHROID 50 MCG OR TABS   LOVASTATIN 20 MG OR TABS   HYDROcodone-acetaminophen (NORCO) 5-325 MG per tablet   EVISTA 60 MG OR TABS         Review of Systems  Pertinent positives and negatives listed in the HPI, all other systems reviewed and are negative.    Physical Exam   BP: (!) 169/94  Pulse: 79  Temp: 97.7  F (36.5  C)  Resp: 18  Height: 156.2 cm (5' 1.5\")  Weight: 60.3 kg (133 lb)  SpO2: 96 %      Physical Exam  /83  Pulse 79  Temp 97.7  F (36.5  C) (Oral)  Resp 18  Ht 1.562 m (5' 1.5\")  Wt 60.3 kg (133 lb)  SpO2 93%  Breastfeeding? No  " BMI 24.72 kg/m2   GENERAL: Alert, cooperative, mild distress  HEAD: Large forehead hematoma with tenderness, no bleeding.  EYES: Conjunctivae clear, EOM intact. PERLL, Pupils are 3 mm.  HEENT:  Normal external ears, normal TM's without evidence of hemotympanum.  No nasal discharge or evidence of septal hematoma. No facial instability or asymmetry. No evidence of intraoral trauma.  Intact bite without malalignment.  NECK: Full painless range of motion.  No midline tenderness, no lateral tenderness.  CHEST:  No crepitus or tenderness with AP or lateral compression  CARDIOVASCULAR : Nml rate, distal pulses are normal and symmetric  LUNGS: No respiratory distress.  GI: Soft, ND, NT.   PELVIS: No crepitus or tenderness with AP or lateral compression  BACK: No step-offs palpated, no tenderness to palpation of thoracic or lumbar spine.  EXTREMITIES: No obvious deformities, tenderness to palpation of right distal radius and ulna.  Ecchymosis and tenderness of the right patella with normal range of motion of the right knee. Ecchymosis and swelling over the right 5th MCP joint.  NEUROLOGICAL : GCS= 15.  Awake, alert, and oriented x 3.  Cranial nerves II-XII grossly intact. Normal muscle strength and tone, sensation to light touch grossly intact in all extremities.  No focal deficits.   SKIN : Normal color, no jaundice or rash. Forehead abrasions.      ED Course     ED Course     Orthopedic injury tx  Date/Time: 11/24/2017 3:03 AM  Performed by: JOSE BECKMAN  Authorized by: JOSE BECKMAN   Consent: Verbal consent obtained.  Consent given by: patient  Patient identity confirmed: verbally with patient  Injury location: wrist  Location details: right wrist  Injury type: fracture  Fracture type: distal radius and ulnar styloid  Pre-procedure neurovascular assessment: neurovascularly intact    Anesthesia:  Local anesthesia used: no    Sedation:  Patient sedated: no  Manipulation performed: no  Immobilization:  splint and sling  Splint type: sugar tong  Supplies used: Ortho-Glass  Post-procedure neurovascular assessment: post-procedure neurovascularly intact  Patient tolerance: Patient tolerated the procedure well with no immediate complications                     Critical Care time:  none               Labs Ordered and Resulted from Time of ED Arrival Up to the Time of Departure from the ED - No data to display    Assessments & Plan (with Medical Decision Making)   82-year-old female presents for evaluation after a fall.  Differential includes intracranial hemorrhage, skull fracture, cervical spine fracture, wrist fracture, patella fracture, soft tissue injury.  Temperature 36.5 C, heart rate 79, SPO2 96% on room air.  Multiple images obtained, all extremities independently as well as radiology and reviewed.  No sensory intracranial hemorrhage, skull fracture, cervical spine fracture, pneumothorax, hemothorax, knee fracture, or ankle fracture.  She does have a fracture of the distal radius and ulna and is placed in a splint as above.  I recheck she is feeling much better and was ambulating without difficulty.  She is safe to discharge home with instructions to follow-up in orthopedic surgery clinic, return if worse, the patient is in agreement with this plan.    I have reviewed the nursing notes.    I have reviewed the findings, diagnosis, plan and need for follow up with the patient.       Discharge Medication List as of 11/24/2017  2:16 AM          Final diagnoses:   Fall, initial encounter   Radius/ulna fracture, right, closed, initial encounter   Traumatic ecchymosis of right knee, initial encounter   Pain in joint, ankle and foot, right   Closed head injury, initial encounter   Right hand pain       11/23/2017   [facility] EMERGENCY DEPARTMENT     Abdulaziz Corona MD  11/24/17 0304       Abdulaziz Corona MD  11/24/17 0305

## 2017-11-24 NOTE — DISCHARGE INSTRUCTIONS
Use rest, ice, elevation to help with your pain.  Use acetaminophen and ibuprofen as needed.  Follow-up in orthopedic surgery clinic for a recheck.  Return to the emergency department if you have worsening symptoms, difficulty breathing, or other concerns.

## 2017-11-28 ENCOUNTER — RECORDS - HEALTHEAST (OUTPATIENT)
Dept: ADMINISTRATIVE | Facility: OTHER | Age: 82
End: 2017-11-28

## 2017-12-12 ENCOUNTER — RECORDS - HEALTHEAST (OUTPATIENT)
Dept: ADMINISTRATIVE | Facility: OTHER | Age: 82
End: 2017-12-12

## 2018-01-11 ENCOUNTER — RECORDS - HEALTHEAST (OUTPATIENT)
Dept: ADMINISTRATIVE | Facility: OTHER | Age: 83
End: 2018-01-11

## 2018-02-22 ENCOUNTER — RECORDS - HEALTHEAST (OUTPATIENT)
Dept: ADMINISTRATIVE | Facility: OTHER | Age: 83
End: 2018-02-22

## 2018-04-26 ENCOUNTER — OFFICE VISIT (OUTPATIENT)
Dept: FAMILY MEDICINE | Facility: CLINIC | Age: 83
End: 2018-04-26
Payer: COMMERCIAL

## 2018-04-26 VITALS
DIASTOLIC BLOOD PRESSURE: 70 MMHG | TEMPERATURE: 98.1 F | BODY MASS INDEX: 25.62 KG/M2 | HEART RATE: 64 BPM | HEIGHT: 62 IN | RESPIRATION RATE: 16 BRPM | WEIGHT: 139.2 LBS | SYSTOLIC BLOOD PRESSURE: 134 MMHG

## 2018-04-26 DIAGNOSIS — E03.9 HYPOTHYROIDISM, UNSPECIFIED TYPE: ICD-10-CM

## 2018-04-26 DIAGNOSIS — E78.5 HYPERLIPIDEMIA, UNSPECIFIED HYPERLIPIDEMIA TYPE: ICD-10-CM

## 2018-04-26 DIAGNOSIS — R06.83 SNORING: ICD-10-CM

## 2018-04-26 DIAGNOSIS — R53.83 FATIGUE, UNSPECIFIED TYPE: Primary | ICD-10-CM

## 2018-04-26 DIAGNOSIS — E87.1 HYPONATREMIA: ICD-10-CM

## 2018-04-26 DIAGNOSIS — I10 BENIGN ESSENTIAL HYPERTENSION: ICD-10-CM

## 2018-04-26 DIAGNOSIS — F41.8 DEPRESSION WITH ANXIETY: ICD-10-CM

## 2018-04-26 LAB
ERYTHROCYTE [DISTWIDTH] IN BLOOD BY AUTOMATED COUNT: 12.4 % (ref 10–15)
HCT VFR BLD AUTO: 38.5 % (ref 35–47)
HGB BLD-MCNC: 13.4 G/DL (ref 11.7–15.7)
MCH RBC QN AUTO: 29.5 PG (ref 26.5–33)
MCHC RBC AUTO-ENTMCNC: 34.8 G/DL (ref 31.5–36.5)
MCV RBC AUTO: 85 FL (ref 78–100)
PLATELET # BLD AUTO: 236 10E9/L (ref 150–450)
RBC # BLD AUTO: 4.54 10E12/L (ref 3.8–5.2)
WBC # BLD AUTO: 5.9 10E9/L (ref 4–11)

## 2018-04-26 PROCEDURE — 36415 COLL VENOUS BLD VENIPUNCTURE: CPT | Performed by: PHYSICIAN ASSISTANT

## 2018-04-26 PROCEDURE — 80053 COMPREHEN METABOLIC PANEL: CPT | Performed by: PHYSICIAN ASSISTANT

## 2018-04-26 PROCEDURE — 85027 COMPLETE CBC AUTOMATED: CPT | Performed by: PHYSICIAN ASSISTANT

## 2018-04-26 PROCEDURE — 84443 ASSAY THYROID STIM HORMONE: CPT | Performed by: PHYSICIAN ASSISTANT

## 2018-04-26 PROCEDURE — 83970 ASSAY OF PARATHORMONE: CPT | Performed by: PHYSICIAN ASSISTANT

## 2018-04-26 PROCEDURE — 80061 LIPID PANEL: CPT | Performed by: PHYSICIAN ASSISTANT

## 2018-04-26 PROCEDURE — 99204 OFFICE O/P NEW MOD 45 MIN: CPT | Performed by: PHYSICIAN ASSISTANT

## 2018-04-26 RX ORDER — LOVASTATIN 20 MG
20 TABLET ORAL
COMMUNITY
Start: 2017-09-18 | End: 2023-07-25

## 2018-04-26 RX ORDER — FOLIC ACID 20 MG
1 CAPSULE ORAL
COMMUNITY
End: 2023-07-25

## 2018-04-26 RX ORDER — DIPHENOXYLATE HYDROCHLORIDE AND ATROPINE SULFATE 2.5; .025 MG/1; MG/1
1 TABLET ORAL
COMMUNITY
End: 2023-07-25

## 2018-04-26 RX ORDER — SODIUM PHOSPHATE,MONO-DIBASIC 19G-7G/118
2 ENEMA (ML) RECTAL
COMMUNITY
End: 2023-07-25

## 2018-04-26 RX ORDER — PYRIDOXINE HCL (VITAMIN B6) 100 MG
200 TABLET ORAL
COMMUNITY

## 2018-04-26 RX ORDER — LEVOTHYROXINE SODIUM 88 UG/1
88 TABLET ORAL DAILY
COMMUNITY
Start: 2017-09-18 | End: 2023-07-27

## 2018-04-26 RX ORDER — CHLORAL HYDRATE 500 MG
1 CAPSULE ORAL
COMMUNITY
End: 2023-07-25

## 2018-04-26 RX ORDER — METOPROLOL SUCCINATE 100 MG/1
100 TABLET, EXTENDED RELEASE ORAL
COMMUNITY
Start: 2017-09-18 | End: 2023-07-25

## 2018-04-26 RX ORDER — ASCORBIC ACID 1000 MG
1 TABLET ORAL
COMMUNITY

## 2018-04-26 RX ORDER — SERTRALINE HYDROCHLORIDE 100 MG/1
100 TABLET, FILM COATED ORAL DAILY
COMMUNITY
Start: 2017-09-18 | End: 2023-07-25

## 2018-04-26 NOTE — NURSING NOTE
"Chief Complaint   Patient presents with     Fatigue       Initial /70 (BP Location: Right arm, Patient Position: Chair, Cuff Size: Adult Regular)  Pulse 64  Temp 98.1  F (36.7  C) (Tympanic)  Resp 16  Ht 5' 1.5\" (1.562 m)  Wt 139 lb 3.2 oz (63.1 kg)  BMI 25.88 kg/m2 Estimated body mass index is 25.88 kg/(m^2) as calculated from the following:    Height as of this encounter: 5' 1.5\" (1.562 m).    Weight as of this encounter: 139 lb 3.2 oz (63.1 kg).      Health Maintenance that is potentially due pending provider review:  NONE        Is there anyone who you would like to be able to receive your results? No  If yes have patient fill out TAWNY      "

## 2018-04-26 NOTE — PATIENT INSTRUCTIONS
"Labs today   See if son can tell you more about snoring - loud enough to be heard through closed door?  Ever seem to stop breathing?  If either of these, reconsider sleep apnea test.  Try moving your \"water deadline\" back from evening to mid-day so that you're drinking more water earlier.  Getting up often to pee disrupts good sleep and can make tired, even if seeming to easily fall back to sleep.  If none of these work, then consider trying wellbutrin - depression medication that can help energy.      Recheck as needed.  "

## 2018-04-26 NOTE — PROGRESS NOTES
SUBJECTIVE:   Polina Fernandez is a 83 year old female who presents to clinic today for the following health issues:      Fatigue      Duration: 2-3 months    Accompanying signs and symptoms: Is tired all the time. No trouble falling asleep at night.  Wakes up about 2-3 times a night to use the bathroom.    History (similar episodes/previous evaluation): Moved into a new house and bought a new mattress.    Precipitating or alleviating factors: None    Therapies tried and outcome: None.   New patient, new to area, now living with son.  [facility] care at [facility].    Diagnosis via Care Everywhere - HTN, joint pains, depression with anxiety, hypothyroid, hypercholesterolemia, osteopenia.  Zoloft, metoprolol, lovastatin, levothyroxine.    Thinks energy had been ok prior to 2-3 months ago, but broke wrist around Thanksgiving and had been not doing much due to this.  Never wakes up feeling very rested.  Can nap within 1 hr.  Urination is every night, for more than 2-3 months.  Drinks lots of water before bed due to thinking didn't drink much in day.  Not other dietary change.  Usually no trouble going back to sleep.  Snores, thinks loudly.  No known apnea.  No morning headaches.    Thinks mood ok.    * Would like a prescription for knee braces.  Was told needs knee replacements.    Problem list and histories reviewed & adjusted, as indicated.  Additional history: as documented    BP Readings from Last 3 Encounters:   04/26/18 134/70   11/24/17 151/83   03/04/17 125/66    Wt Readings from Last 3 Encounters:   04/26/18 139 lb 3.2 oz (63.1 kg)   11/23/17 133 lb (60.3 kg)   03/04/17 135 lb (61.2 kg)        Labs reviewed in EPIC    Reviewed and updated as needed this visit by clinical staff  Tobacco  Allergies  Meds  Problems  Med Hx  Surg Hx  Fam Hx  Soc Hx        Reviewed and updated as needed this visit by Provider  Tobacco  Allergies  Meds  Problems  Med Hx  Surg Hx  Fam Hx  Soc Hx       "    ROS:  Constitutional, HEENT, cardiovascular, pulmonary, GI, , musculoskeletal, neuro, skin, endocrine and psych systems are negative, except as otherwise noted.  Occasionally weak jittery and feel like cold sweat - maybe once every 2-3 months.  Chronic unchanged tinnitus.      OBJECTIVE:     /70 (BP Location: Right arm, Patient Position: Chair, Cuff Size: Adult Regular)  Pulse 64  Temp 98.1  F (36.7  C) (Tympanic)  Resp 16  Ht 5' 1.5\" (1.562 m)  Wt 139 lb 3.2 oz (63.1 kg)  BMI 25.88 kg/m2  Body mass index is 25.88 kg/(m^2).  GENERAL: healthy, alert and no distress  EYES: Eyes grossly normal to inspection, PERRL and conjunctivae and sclerae normal  HENT: ear canals and TM's normal, nose and mouth without ulcers or lesions  NECK: no adenopathy, no asymmetry, masses, or scars and thyroid normal to palpation  RESP: lungs clear to auscultation - no rales, rhonchi or wheezes  CV: regular rate and rhythm, normal S1 S2, no S3 or S4, no murmur, click or rub, no peripheral edema and peripheral pulses strong  ABDOMEN: soft, nontender, no hepatosplenomegaly, no masses and bowel sounds normal  MS: no gross musculoskeletal defects noted, no edema  SKIN: no suspicious lesions or rashes  NEURO: Normal strength and tone, mentation intact and speech normal  PSYCH: mentation appears normal, affect normal/bright    Diagnostic Test Results:  none     ASSESSMENT/PLAN:       ICD-10-CM    1. Fatigue, unspecified type R53.83 Comprehensive metabolic panel     TSH with free T4 reflex     TSH     CBC with platelets     Parathyroid Hormone Intact   2. Depression with anxiety F41.8    3. Hyponatremia E87.1 Sodium random urine     Osmolality urine     Osmolality     Sodium   4. Hypothyroidism, unspecified type E03.9    5. Benign essential hypertension I10    6. Snoring R06.83    7. Hyperlipidemia, unspecified hyperlipidemia type E78.5 Lipid panel reflex to direct LDL Non-fasting       Patient Instructions   Labs today   See if " "son can tell you more about snoring - loud enough to be heard through closed door?  Ever seem to stop breathing?  If either of these, reconsider sleep apnea test.  Try moving your \"water deadline\" back from evening to mid-day so that you're drinking more water earlier.  Getting up often to pee disrupts good sleep and can make tired, even if seeming to easily fall back to sleep.  If none of these work, then consider trying wellbutrin - depression medication that can help energy.      Recheck as needed.      Batool Rosales PA-C  [facility]    "

## 2018-04-26 NOTE — MR AVS SNAPSHOT
"              After Visit Summary   4/26/2018    Polina Fernandez    MRN: [id number]           Patient Information     Date Of Birth          1935        Visit Information        Provider Department      4/26/2018 2:00 PM Batool Rosales PA-C [facility]        Today's Diagnoses     Fatigue, unspecified type    -  1    Depression with anxiety        Hypothyroidism, unspecified type        Benign essential hypertension        Snoring        Hyperlipidemia, unspecified hyperlipidemia type          Care Instructions    Labs today   See if son can tell you more about snoring - loud enough to be heard through closed door?  Ever seem to stop breathing?  If either of these, reconsider sleep apnea test.  Try moving your \"water deadline\" back from evening to mid-day so that you're drinking more water earlier.  Getting up often to pee disrupts good sleep and can make tired, even if seeming to easily fall back to sleep.  If none of these work, then consider trying wellbutrin - depression medication that can help energy.      Recheck as needed.          Follow-ups after your visit        Who to contact     If you have questions or need follow up information about today's clinic visit or your schedule please contact [facility] directly at [phone].  Normal or non-critical lab and imaging results will be communicated to you by MyChart, letter or phone within 4 business days after the clinic has received the results. If you do not hear from us within 7 days, please contact the clinic through MagForce[employer] or phone. If you have a critical or abnormal lab result, we will notify you by phone as soon as possible.  Submit refill requests through LATTO or call your pharmacy and they will forward the refill request to us. Please allow 3 business days for your refill to be completed.          Additional Information About Your Visit        MyChart Information     LATTO lets you send " "messages to your doctor, view your test results, renew your prescriptions, schedule appointments and more. To sign up, go to [email or web address] . Click on \"Log in\" on the left side of the screen, which will take you to the Welcome page. Then click on \"Sign up Now\" on the right side of the page.     You will be asked to enter the access code listed below, as well as some personal information. Please follow the directions to create your username and password.     Your access code is: [email or web address]  Expires: 2018  2:53 PM     Your access code will  in 90 days. If you need help or a new code, please call your [place] clinic or [phone].        Care EveryWhere ID     This is your Care EveryWhere ID. This could be used by other organizations to access your [place] medical records  [id number]        Your Vitals Were     Pulse Temperature Respirations Height BMI (Body Mass Index)       64 98.1  F (36.7  C) (Tympanic) 16 5' 1.5\" (1.562 m) 25.88 kg/m2        Blood Pressure from Last 3 Encounters:   18 134/70   17 151/83   17 125/66    Weight from Last 3 Encounters:   18 139 lb 3.2 oz (63.1 kg)   17 133 lb (60.3 kg)   17 135 lb (61.2 kg)              We Performed the Following     CBC with platelets     Comprehensive metabolic panel     Lipid panel reflex to direct LDL Non-fasting     Parathyroid Hormone Intact     TSH with free T4 reflex     TSH          Today's Medication Changes          These changes are accurate as of 18  2:54 PM.  If you have any questions, ask your nurse or doctor.               These medicines have changed or have updated prescriptions.        Dose/Directions    levothyroxine 88 MCG tablet   Commonly known as:  SYNTHROID/LEVOTHROID   This may have changed:  Another medication with the same name was removed. Continue taking this medication, and follow the directions you see here.   Changed by:  Batool Rosales PA-C        Dose:  88 " mcg   Take 88 mcg by mouth daily   Refills:  0       lovastatin 20 MG tablet   Commonly known as:  MEVACOR   This may have changed:  Another medication with the same name was removed. Continue taking this medication, and follow the directions you see here.   Changed by:  Batool Rosales PA-C        Dose:  20 mg   Take 20 mg by mouth   Refills:  0       sertraline 100 MG tablet   Commonly known as:  ZOLOFT   This may have changed:  Another medication with the same name was removed. Continue taking this medication, and follow the directions you see here.   Changed by:  Batool Rosales PA-C        Dose:  150 mg   Take 150 mg by mouth daily   Refills:  0         Stop taking these medicines if you haven't already. Please contact your care team if you have questions.     EVISTA 60 MG tablet   Generic drug:  raloxifene   Stopped by:  Batool Rosales PA-C           HYDROcodone-acetaminophen 5-325 MG per tablet   Commonly known as:  NORCO   Stopped by:  Batool Rosales PA-C                    Primary Care Provider Office Phone # Fax #    Usha Johnson [phone]       [facility] [address] [place] [address]        Equal Access to Services     [facility]: Hadii aad ku hadasho Soomaali, waaxda luqadaha, qaybta kaalmada adeegyada, pippa nicole. So [facility] [phone].    ATENCIÓN: Si habla español, tiene a bruner disposición servicios gratuitos de asistencia lingüística. Mayeliname al [phone].    We comply with applicable federal civil rights laws and [place] laws. We do not discriminate on the basis of race, color, national origin, age, disability, sex, sexual orientation, or gender identity.            Thank you!     Thank you for choosing [facility]  for your care. Our goal is always to provide you with excellent care. Hearing back from our patients is one way we can continue to improve our services. Please  take a few minutes to complete the written survey that you may receive in the mail after your visit with us. Thank you!             Your Updated Medication List - Protect others around you: Learn how to safely use, store and throw away your medicines at www.disposemymeds.org.          This list is accurate as of 4/26/18  2:54 PM.  Always use your most recent med list.                   Brand Name Dispense Instructions for use Diagnosis    aspirin 81 MG chewable tablet      Take 81 mg by mouth daily        Co Q 10 10 MG Caps      Take 1 capsule by mouth        fish oil-omega-3 fatty acids 1000 MG capsule      Take 1 capsule by mouth        folic acid 20 MG Caps      Take 1 capsule by mouth        glucosamine-chondroitin 500-400 MG Caps per capsule      Take 2 tablets by mouth        levothyroxine 88 MCG tablet    SYNTHROID/LEVOTHROID     Take 88 mcg by mouth daily        lovastatin 20 MG tablet    MEVACOR     Take 20 mg by mouth        Lysine 500 MG Caps      Take 1 capsule by mouth        metoprolol succinate 100 MG 24 hr tablet    TOPROL-XL     Take 100 mg by mouth        MULTI-VITAMINS Tabs      Take 1 tablet by mouth        Selenium 100 MCG Tabs      Take 1 tablet by mouth        sertraline 100 MG tablet    ZOLOFT     Take 150 mg by mouth daily        VITAMIN C (CALCIUM ASCORBATE) PO      Chew and swallow 1 tablet daily.

## 2018-04-27 PROBLEM — E87.1 HYPONATREMIA: Status: ACTIVE | Noted: 2018-04-27

## 2018-04-27 LAB
ALBUMIN SERPL-MCNC: 4 G/DL (ref 3.4–5)
ALP SERPL-CCNC: 99 U/L (ref 40–150)
ALT SERPL W P-5'-P-CCNC: 34 U/L (ref 0–50)
ANION GAP SERPL CALCULATED.3IONS-SCNC: 3 MMOL/L (ref 3–14)
AST SERPL W P-5'-P-CCNC: 23 U/L (ref 0–45)
BILIRUB SERPL-MCNC: 0.5 MG/DL (ref 0.2–1.3)
BUN SERPL-MCNC: 10 MG/DL (ref 7–30)
CALCIUM SERPL-MCNC: 8.5 MG/DL (ref 8.5–10.1)
CHLORIDE SERPL-SCNC: 97 MMOL/L (ref 94–109)
CHOLEST SERPL-MCNC: 182 MG/DL
CO2 SERPL-SCNC: 30 MMOL/L (ref 20–32)
CREAT SERPL-MCNC: 0.62 MG/DL (ref 0.52–1.04)
GFR SERPL CREATININE-BSD FRML MDRD: >90 ML/MIN/1.7M2
GLUCOSE SERPL-MCNC: 98 MG/DL (ref 70–99)
HDLC SERPL-MCNC: 52 MG/DL
LDLC SERPL CALC-MCNC: 100 MG/DL
NONHDLC SERPL-MCNC: 130 MG/DL
POTASSIUM SERPL-SCNC: 4 MMOL/L (ref 3.4–5.3)
PROT SERPL-MCNC: 7.4 G/DL (ref 6.8–8.8)
PTH-INTACT SERPL-MCNC: 41 PG/ML (ref 18–80)
SODIUM SERPL-SCNC: 130 MMOL/L (ref 133–144)
TRIGL SERPL-MCNC: 148 MG/DL
TSH SERPL DL<=0.005 MIU/L-ACNC: 2.59 MU/L (ref 0.4–4)
TSH SERPL DL<=0.005 MIU/L-ACNC: 2.59 MU/L (ref 0.4–4)

## 2018-04-30 DIAGNOSIS — E87.1 HYPONATREMIA: ICD-10-CM

## 2018-04-30 PROCEDURE — 83935 ASSAY OF URINE OSMOLALITY: CPT | Performed by: PHYSICIAN ASSISTANT

## 2018-04-30 PROCEDURE — 36415 COLL VENOUS BLD VENIPUNCTURE: CPT | Performed by: PHYSICIAN ASSISTANT

## 2018-04-30 PROCEDURE — 83930 ASSAY OF BLOOD OSMOLALITY: CPT | Performed by: PHYSICIAN ASSISTANT

## 2018-04-30 PROCEDURE — 84295 ASSAY OF SERUM SODIUM: CPT | Performed by: PHYSICIAN ASSISTANT

## 2018-04-30 PROCEDURE — 84300 ASSAY OF URINE SODIUM: CPT | Performed by: PHYSICIAN ASSISTANT

## 2018-05-01 ENCOUNTER — TELEPHONE (OUTPATIENT)
Dept: FAMILY MEDICINE | Facility: CLINIC | Age: 83
End: 2018-05-01

## 2018-05-01 LAB
CREAT UR-MCNC: 47 MG/DL
OSMOLALITY SERPL: 278 MMOL/KG (ref 280–301)
OSMOLALITY UR: 401 MMOL/KG (ref 100–1200)
SODIUM SERPL-SCNC: 134 MMOL/L (ref 133–144)
SODIUM UR-SCNC: 56 MMOL/L

## 2018-05-01 NOTE — TELEPHONE ENCOUNTER
"Reason for Call:  Other call back    Detailed comments: Patient calling requesting to speak with Batool's RN about some updates. Patient states she was \"spotting\" yesterday.    Phone Number Patient can be reached at: Home number on file [phone] (home)    Best Time: any    Can we leave a detailed message on this number? YES    Call taken on 5/1/2018 at 1:54 PM by Ledy Arora      "

## 2018-05-01 NOTE — TELEPHONE ENCOUNTER
Says she had some vaginal spotting yesterday, 3 separate times. Do you want to see her or do US? She is aware we will be calling her back tomorrow

## 2018-06-04 ENCOUNTER — OFFICE VISIT (OUTPATIENT)
Dept: OBGYN | Facility: CLINIC | Age: 83
End: 2018-06-04
Payer: COMMERCIAL

## 2018-06-04 VITALS
WEIGHT: 136 LBS | DIASTOLIC BLOOD PRESSURE: 74 MMHG | BODY MASS INDEX: 25.03 KG/M2 | TEMPERATURE: 97.2 F | HEIGHT: 62 IN | RESPIRATION RATE: 18 BRPM | SYSTOLIC BLOOD PRESSURE: 130 MMHG | HEART RATE: 70 BPM

## 2018-06-04 DIAGNOSIS — N95.0 POSTMENOPAUSAL BLEEDING: Primary | ICD-10-CM

## 2018-06-04 PROCEDURE — 99243 OFF/OP CNSLTJ NEW/EST LOW 30: CPT | Mod: 25 | Performed by: OBSTETRICS & GYNECOLOGY

## 2018-06-04 PROCEDURE — 58100 BIOPSY OF UTERUS LINING: CPT | Performed by: OBSTETRICS & GYNECOLOGY

## 2018-06-04 PROCEDURE — 88305 TISSUE EXAM BY PATHOLOGIST: CPT | Performed by: OBSTETRICS & GYNECOLOGY

## 2018-06-04 NOTE — MR AVS SNAPSHOT
"              After Visit Summary   6/4/2018    Polina Fernandez    MRN: [id number]           Patient Information     Date Of Birth          1935        Visit Information        Provider Department      6/4/2018 3:30 PM Jeanne Elise MD [facility]        Today's Diagnoses     Postmenopausal bleeding    -  1       Follow-ups after your visit        Who to contact     If you have questions or need follow up information about today's clinic visit or your schedule please contact [facility] directly at [phone].  Normal or non-critical lab and imaging results will be communicated to you by MyChart, letter or phone within 4 business days after the clinic has received the results. If you do not hear from us within 7 days, please contact the clinic through MyChart or phone. If you have a critical or abnormal lab result, we will notify you by phone as soon as possible.  Submit refill requests through [employer] or call your pharmacy and they will forward the refill request to us. Please allow 3 business days for your refill to be completed.          Additional Information About Your Visit        Care EveryWhere ID     This is your Care EveryWhere ID. This could be used by other organizations to access your [place] medical records  [id number]        Your Vitals Were     Pulse Temperature Respirations Height BMI (Body Mass Index)       70 97.2  F (36.2  C) (Tympanic) 18 5' 1.5\" (1.562 m) 25.28 kg/m2        Blood Pressure from Last 3 Encounters:   06/04/18 130/74   04/26/18 134/70   11/24/17 151/83    Weight from Last 3 Encounters:   06/04/18 136 lb (61.7 kg)   04/26/18 139 lb 3.2 oz (63.1 kg)   11/23/17 133 lb (60.3 kg)              We Performed the Following     ENDOMETRIAL BIOPSY W/O CERVICAL DILATION        Primary Care Provider Office Phone # Fax #    Usha Johnson [phone]       [address] [address]        Equal " Access to Services     [facility]: Hadii aad ku hadcorwinginger Florinatony, wamildredda luqadaha, qadawnta kacicipippa breaux. So [facility] [phone].    ATENCIÓN: Si habla dex, tiene a bruner disposición servicios gratuitos de asistencia lingüística. Llame al [phone].    We comply with applicable federal civil rights laws and [place] laws. We do not discriminate on the basis of race, color, national origin, age, disability, sex, sexual orientation, or gender identity.            Thank you!     Thank you for choosing [facility]  for your care. Our goal is always to provide you with excellent care. Hearing back from our patients is one way we can continue to improve our services. Please take a few minutes to complete the written survey that you may receive in the mail after your visit with us. Thank you!             Your Updated Medication List - Protect others around you: Learn how to safely use, store and throw away your medicines at www.disposemymeds.org.          This list is accurate as of 6/4/18  3:54 PM.  Always use your most recent med list.                   Brand Name Dispense Instructions for use Diagnosis    aspirin 81 MG chewable tablet      Take 81 mg by mouth daily        Co Q 10 10 MG Caps      Take 1 capsule by mouth        fish oil-omega-3 fatty acids 1000 MG capsule      Take 1 capsule by mouth        folic acid 20 MG Caps      Take 1 capsule by mouth        glucosamine-chondroitin 500-400 MG Caps per capsule      Take 2 tablets by mouth        levothyroxine 88 MCG tablet    SYNTHROID/LEVOTHROID     Take 88 mcg by mouth daily        lovastatin 20 MG tablet    MEVACOR     Take 20 mg by mouth        Lysine 500 MG Caps      Take 1 capsule by mouth        metoprolol succinate 100 MG 24 hr tablet    TOPROL-XL     Take 100 mg by mouth        MULTI-VITAMINS Tabs      Take 1 tablet by mouth        Selenium 100 MCG Tabs      Take 1 tablet by mouth        sertraline  100 MG tablet    ZOLOFT     Take 150 mg by mouth daily        VITAMIN C (CALCIUM ASCORBATE) PO      Chew and swallow 1 tablet daily.

## 2018-06-04 NOTE — PROGRESS NOTES
Polina is a 83 year old   female who presents for consult as requested by Batool Hightower; she states on 18 she had some vaginal bleeding; saw blood on panty liner, small amount x 2, and none since that time; had some mild cramping but no change in urinary or defecatory habits; she is not on HRT; was on EVISTA years ago. SHe has h/o cervical cone/LEEP in  but she has no personal recollection of why or of abnormal Pap smear. She is not sexually active; she has not had a hysterctomy    Patient Active Problem List    Diagnosis Date Noted     Hyponatremia 2018     Priority: Medium     Depression with anxiety 2018     Priority: Medium     Hypothyroidism, unspecified type 2018     Priority: Medium     Benign essential hypertension 2018     Priority: Medium     Hyperlipidemia, unspecified hyperlipidemia type 2018     Priority: Medium       All systems were reviewed and pertinent information in noted in subjective/HPI.    Past Medical History:   Diagnosis Date     Hypercholesteremia      Hypertension        Past Surgical History:   Procedure Laterality Date     APPENDECTOMY       HC CONIZATION CERVIX, LOOP ELECTRODE EXCISION  2005     HC REVISE MEDIAN N/CARPAL TUNNEL SURG       TONSILLECTOMY           Current Outpatient Prescriptions:      aspirin 81 MG chewable tablet, Take 81 mg by mouth daily, Disp: , Rfl:      Coenzyme Q10 (CO Q 10) 10 MG CAPS, Take 1 capsule by mouth, Disp: , Rfl:      fish oil-omega-3 fatty acids 1000 MG capsule, Take 1 capsule by mouth, Disp: , Rfl:      folic acid 20 MG CAPS, Take 1 capsule by mouth, Disp: , Rfl:      glucosamine-chondroitin 500-400 MG CAPS per capsule, Take 2 tablets by mouth, Disp: , Rfl:      levothyroxine (SYNTHROID/LEVOTHROID) 88 MCG tablet, Take 88 mcg by mouth daily, Disp: , Rfl:      lovastatin (MEVACOR) 20 MG tablet, Take 20 mg by mouth, Disp: , Rfl:      Lysine 500 MG CAPS, Take 1 capsule by mouth, Disp: , Rfl:       "metoprolol succinate (TOPROL-XL) 100 MG 24 hr tablet, Take 100 mg by mouth, Disp: , Rfl:      Multiple Vitamin (MULTI-VITAMINS) TABS, Take 1 tablet by mouth, Disp: , Rfl:      Selenium 100 MCG TABS, Take 1 tablet by mouth, Disp: , Rfl:      sertraline (ZOLOFT) 100 MG tablet, Take 150 mg by mouth daily, Disp: , Rfl:      VITAMIN C, CALCIUM ASCORBATE, PO, Chew and swallow 1 tablet daily., Disp: , Rfl:     ALLERGIES:  Lisinopril    Social History     Social History     Marital status:      Spouse name: N/A     Number of children: N/A     Years of education: N/A     Social History Main Topics     Smoking status: Never Smoker     Smokeless tobacco: Never Used     Alcohol use No     Drug use: No     Sexual activity: Not Asked     Other Topics Concern     None     Social History Narrative       History reviewed. No pertinent family history.    OBJECTIVE:  Vitals: /74 (BP Location: Right arm, Patient Position: Chair, Cuff Size: Adult Small)  Pulse 70  Temp 97.2  F (36.2  C) (Tympanic)  Resp 18  Ht 5' 1.5\" (1.562 m)  Wt 136 lb (61.7 kg)  BMI 25.28 kg/m2 BMI= Body mass index is 25.28 kg/(m^2).   No LMP recorded. Patient is postmenopausal.     GENERAL APPEARANCE: healthy, alert and no distress  ABDOMEN:  soft, nontender, no hepato-splenomegaly or hernias  PELVIC:  EGBUS:  within normal limits  VAGINA:  Atrophic ; no source of bleeding sen  CERVIX:  Post LEEP changes with stenotic canal; no source of bleeding noted visually  The cervix was visualized with the speculum, and cleansed with an iodine solution.  The anterior cervix was grasped with a tenaculum and a pipelle advanced into the uterine cavity, with a sounded depth of 5 cm.  A representative sample (very scant) was aspirated from the cavity and sent for pathological examination. Pt reported a lot of cramping with the procedure.     UTERUS:  anteverted, not enlarged, non tender  ADNEXAE:  non-tender, no masses palpable, no cul de sac nodularity   " RECTUM: normal tone, guaic not done; external hemorroidal tags noted; no active bleeding seen    ASSESSMENT:      ICD-10-CM    1. Postmenopausal bleeding N95.0 ENDOMETRIAL BIOPSY W/O CERVICAL DILATION       PLAN:  Recommend check results from ENDOMETRIAL BIOPSY; if negative, then will monitor; if recurring bleeding, then obtain sonogram and consider further evaluation with hysteroscopy        Jeanne Elise MD    Cc: Baotol Rosales

## 2018-06-05 LAB — COPATH REPORT: NORMAL

## 2018-06-06 NOTE — PROGRESS NOTES
Call to pt to notify of below.  Unable to reach.  Left message for pt to call back     Litzy Guillaume   [facility]  RN

## 2018-06-07 ENCOUNTER — TELEPHONE (OUTPATIENT)
Dept: OBGYN | Facility: CLINIC | Age: 83
End: 2018-06-07

## 2018-06-07 NOTE — PROGRESS NOTES
Pt notified of below.  Pt reports understanding.  Pt does not have further questions or concerns.  Patient amenable to the ultrasound.  Will place order for US per Dr. Elise's recommendation.    Litzy Guillaume   [facility]yn Clinic  RN

## 2018-06-11 ENCOUNTER — HOSPITAL ENCOUNTER (OUTPATIENT)
Dept: ULTRASOUND IMAGING | Facility: CLINIC | Age: 83
Discharge: HOME | End: 2018-06-11
Attending: OBSTETRICS & GYNECOLOGY | Admitting: OBSTETRICS & GYNECOLOGY
Payer: COMMERCIAL

## 2018-06-11 DIAGNOSIS — N95.0 POSTMENOPAUSAL BLEEDING: ICD-10-CM

## 2018-06-11 PROCEDURE — 76856 US EXAM PELVIC COMPLETE: CPT

## 2018-06-12 ENCOUNTER — RECORDS - HEALTHEAST (OUTPATIENT)
Dept: ADMINISTRATIVE | Facility: OTHER | Age: 83
End: 2018-06-12

## 2018-07-27 ENCOUNTER — OFFICE VISIT (OUTPATIENT)
Dept: FAMILY MEDICINE | Facility: CLINIC | Age: 83
End: 2018-07-27
Payer: COMMERCIAL

## 2018-07-27 VITALS
RESPIRATION RATE: 16 BRPM | WEIGHT: 137.6 LBS | BODY MASS INDEX: 25.58 KG/M2 | TEMPERATURE: 98 F | HEART RATE: 76 BPM | SYSTOLIC BLOOD PRESSURE: 152 MMHG | DIASTOLIC BLOOD PRESSURE: 80 MMHG

## 2018-07-27 DIAGNOSIS — R05.9 COUGH: ICD-10-CM

## 2018-07-27 DIAGNOSIS — R49.0 HOARSENESS: ICD-10-CM

## 2018-07-27 DIAGNOSIS — Z78.0 ASYMPTOMATIC POSTMENOPAUSAL STATE: ICD-10-CM

## 2018-07-27 DIAGNOSIS — M79.18 PAIN IN LEFT BUTTOCK: Primary | ICD-10-CM

## 2018-07-27 PROCEDURE — 99214 OFFICE O/P EST MOD 30 MIN: CPT | Performed by: FAMILY MEDICINE

## 2018-07-27 ASSESSMENT — PATIENT HEALTH QUESTIONNAIRE - PHQ9: 5. POOR APPETITE OR OVEREATING: SEVERAL DAYS

## 2018-07-27 ASSESSMENT — ANXIETY QUESTIONNAIRES
5. BEING SO RESTLESS THAT IT IS HARD TO SIT STILL: NOT AT ALL
2. NOT BEING ABLE TO STOP OR CONTROL WORRYING: SEVERAL DAYS
6. BECOMING EASILY ANNOYED OR IRRITABLE: NOT AT ALL
3. WORRYING TOO MUCH ABOUT DIFFERENT THINGS: SEVERAL DAYS
GAD7 TOTAL SCORE: 4
7. FEELING AFRAID AS IF SOMETHING AWFUL MIGHT HAPPEN: NOT AT ALL
1. FEELING NERVOUS, ANXIOUS, OR ON EDGE: SEVERAL DAYS

## 2018-07-27 NOTE — PATIENT INSTRUCTIONS
ASSESSMENT:   (M79.1) Pain in left buttock  (primary encounter diagnosis)  Comment: I think this is muscular-ligamentous.  I do not think it is a pinched nerve or sciatica.   Plan:   OK for the topical treatments like the Aspercreme.     Consider physical therapy foir the buttock pain.  WE can do a referral if you like.   Massage treatment could help but often not covered by insurance.     Pain and anti-inflammatory medication options:  Ibuprofen 200mg OTC may be taken up to 4 pills 4 times a day to the maximum of 3200mg or 16 pills daily as needed for pain.     Naproxen (Aleve) OTC may be taken up to 2 pills 2 times a day to the maximum of 4 pills daily as needed for pain.     Aspirin may be taken up to 2-3 pills every 4 hours as needed for pain.     Take only one type of these at a time and take with food as they all can irritate stomach and cause ulcers.      Other pain medication:  Acetaminophen (Tylenol) may be taken as needed for pain and fever.  The maximum doses are listed below, around 3000mg a day.  Regular strength pills are 325mg.  The maximum daily dose is two pills (650mg) every 4 to 6 hours up to 3250mg a day.   Extra strength pills are 500mg each.  The maximum dose is two pills (1000mg) every 6 hours as needed up to 3000mg a day.   Extended release pills are 650mg each.  The maximum dose is two pills (1300mg) every 8 hours as needed up to 3900mg a day.     Watch out for acetaminophen in other over the counter or prescription medications.      Too much acetaminophen can lead to serious liver damage.  It is OK to take acetaminophen with above anti-inflammatory medications.      (R49.0) Hoarseness  Comment:   Plan: Try omeprazole 20mg once daily for 2 weeks.  IF it helps, take it for a full 2 months.    If it makes no difference in the cough or hoarseness, I suggest seeing ENT doctor for look at vocal cords and throat.     (R05) Cough  Comment: see above     Schedule an appointment with diagnostics for  bone density test.   Call [phone] to schedule.

## 2018-07-27 NOTE — PROGRESS NOTES
"  SUBJECTIVE:   Polina Fernandez is a 83 year old female who presents to clinic today for the following health issues:  Chief Complaint   Patient presents with     buttocks pain     Throat Problem      Concern - Left buttocks pain  Onset: x 3-4 month    Description:   Patient states when she is walking or standing she gets a dull pain in her left buttocks Patient states it is off and on. Sometimes she;ll feel it when she's laying in bed. No injury known. Patient states she thinks it's maybe a nerve pain.    Intensity: moderate dull pain    Progression of Symptoms:  intermittent    Accompanying Signs & Symptoms:  no    Therapies Tried and outcome: asper cream- pt thinks it helps.    Location: left buttock.   No injury or strain known.   Aggravating factors: walking standing more than sitting.   Some low back pain in the past.  Can bother if she \"overdoes it\".   No pain in legs or neuro symptoms.   Alleviating factors: aspercreme.      Some neck pain and arthritis in hands.         Acute Illness   Acute illness concerns: Throat issue  Onset: on going for year for the cough and pts voice has been raspy for about 6 months.    Fever: no    Chills/Sweats: no    Headache (location?): no    Sinus Pressure:no    Conjunctivitis:  no    Ear Pain: no    Rhinorrhea: no    Congestion: no    Sore Throat: no- raspy/ hoarse voice     Cough: YES    Wheeze: no    Decreased Appetite: no    Nausea: no    Vomiting: no    Diarrhea:  no    Dysuria/Freq.: no    Fatigue/Achiness: no    Sick/Strep Exposure: no     Therapies Tried and outcome: Cough drops    Voice seems lower and raspier.    No sore throat.     Some cough, non-productive.   Non postnasal drainage or nasal congestion.   No heartburn or waterbrash.   No shortness of breath.  No dyspnea on exertion.   No dysphagia.   Never smoked.  No history of asthma or allergies.                   Patient Active Problem List   Diagnosis     Depression with anxiety     Hypothyroidism, " unspecified type     Benign essential hypertension     Hyperlipidemia, unspecified hyperlipidemia type     Hyponatremia      ROS:  Mood OK.  PHQ9 score today= 2, generalized anxiety disorder scale score today= 4.    Lives with son who has problems that get her upset.     OBJECTIVE:Blood pressure 152/80, pulse 76, temperature 98  F (36.7  C), temperature source Tympanic, resp. rate 16, weight 137 lb 9.6 oz (62.4 kg), not currently breastfeeding. BMI=Body mass index is 25.58 kg/(m^2).  GENERAL APPEARANCE ADULT: Alert, no acute distress  HENT: nose mucosa normal, oral mucous membranes moist, oropharynx clear  NECK: No adenopathy,masses or thyromegaly  ABDOMEN: soft, no organomegaly, masses or tenderness  MS: back exam: normal posture, shoulders, inferior scapular borders and hips even and symmetrical, moves about the exam room comfortably, full ROM, straight leg raise right normal, straight leg raise left normal, reflexes at knees normal, reflexes at ankles normal, heel and toe walk normal  hip exam:She has normal range of motion both hips without pain.   Buttock non tender.      ASSESSMENT:   (M79.1) Pain in left buttock  (primary encounter diagnosis)  Comment: I think this is muscular-ligamentous.  I do not think it is a pinched nerve or sciatica.   Plan:   OK for the topical treatments like the Aspercreme.     Consider physical therapy foir the buttock pain.  WE can do a referral if you like.   Massage treatment could help but often not covered by insurance.     Pain and anti-inflammatory medication options:  Ibuprofen 200mg OTC may be taken up to 4 pills 4 times a day to the maximum of 3200mg or 16 pills daily as needed for pain.     Naproxen (Aleve) OTC may be taken up to 2 pills 2 times a day to the maximum of 4 pills daily as needed for pain.     Aspirin may be taken up to 2-3 pills every 4 hours as needed for pain.     Take only one type of these at a time and take with food as they all can irritate stomach and  cause ulcers.      Other pain medication:  Acetaminophen (Tylenol) may be taken as needed for pain and fever.  The maximum doses are listed below, around 3000mg a day.  Regular strength pills are 325mg.  The maximum daily dose is two pills (650mg) every 4 to 6 hours up to 3250mg a day.   Extra strength pills are 500mg each.  The maximum dose is two pills (1000mg) every 6 hours as needed up to 3000mg a day.   Extended release pills are 650mg each.  The maximum dose is two pills (1300mg) every 8 hours as needed up to 3900mg a day.     Watch out for acetaminophen in other over the counter or prescription medications.      Too much acetaminophen can lead to serious liver damage.  It is OK to take acetaminophen with above anti-inflammatory medications.      (R49.0) Hoarseness  Comment:   Plan: Try omeprazole 20mg once daily for 2 weeks.  IF it helps, take it for a full 2 months.    If it makes no difference in the cough or hoarseness, I suggest seeing ENT doctor for look at vocal cords and throat.     (R05) Cough  Comment: see above     Schedule an appointment with diagnostics for bone density test.   Call [phone] to schedule.

## 2018-07-27 NOTE — MR AVS SNAPSHOT
After Visit Summary   7/27/2018    Polina Fernandez    MRN: [id number]           Patient Information     Date Of Birth          1935        Visit Information        Provider Department      7/27/2018 1:40 PM Kahlil Ramirez MD [facility]        Today's Diagnoses     Pain in left buttock    -  1    Hoarseness        Cough        Asymptomatic postmenopausal state          Care Instructions    ASSESSMENT:   (M79.1) Pain in left buttock  (primary encounter diagnosis)  Comment: I think this is muscular-ligamentous.  I do not think it is a pinched nerve or sciatica.   Plan:   OK for the topical treatments like the Aspercreme.     Consider physical therapy foir the buttock pain.  WE can do a referral if you like.   Massage treatment could help but often not covered by insurance.     Pain and anti-inflammatory medication options:  Ibuprofen 200mg OTC may be taken up to 4 pills 4 times a day to the maximum of 3200mg or 16 pills daily as needed for pain.     Naproxen (Aleve) OTC may be taken up to 2 pills 2 times a day to the maximum of 4 pills daily as needed for pain.     Aspirin may be taken up to 2-3 pills every 4 hours as needed for pain.     Take only one type of these at a time and take with food as they all can irritate stomach and cause ulcers.      Other pain medication:  Acetaminophen (Tylenol) may be taken as needed for pain and fever.  The maximum doses are listed below, around 3000mg a day.  Regular strength pills are 325mg.  The maximum daily dose is two pills (650mg) every 4 to 6 hours up to 3250mg a day.   Extra strength pills are 500mg each.  The maximum dose is two pills (1000mg) every 6 hours as needed up to 3000mg a day.   Extended release pills are 650mg each.  The maximum dose is two pills (1300mg) every 8 hours as needed up to 3900mg a day.     Watch out for acetaminophen in other over the counter or prescription medications.      Too much acetaminophen can  lead to serious liver damage.  It is OK to take acetaminophen with above anti-inflammatory medications.      (R49.0) Hoarseness  Comment:   Plan: Try omeprazole 20mg once daily for 2 weeks.  IF it helps, take it for a full 2 months.    If it makes no difference in the cough or hoarseness, I suggest seeing ENT doctor for look at vocal cords and throat.     (R05) Cough  Comment: see above     Schedule an appointment with diagnostics for bone density test.   Call [phone] to schedule.           Follow-ups after your visit        Future tests that were ordered for you today     Open Future Orders        Priority Expected Expires Ordered    DX Hip/Pelvis/Spine Routine  7/27/2019 7/27/2018            Who to contact     If you have questions or need follow up information about today's clinic visit or your schedule please contact [facility] directly at [phone].  Normal or non-critical lab and imaging results will be communicated to you by MyChart, letter or phone within 4 business days after the clinic has received the results. If you do not hear from us within 7 days, please contact the clinic through MyChart or phone. If you have a critical or abnormal lab result, we will notify you by phone as soon as possible.  Submit refill requests through [employer] or call your pharmacy and they will forward the refill request to us. Please allow 3 business days for your refill to be completed.          Additional Information About Your Visit        Care EveryWhere ID     This is your Care EveryWhere ID. This could be used by other organizations to access your [facility] medical records  [id number]        Your Vitals Were     Pulse Temperature Respirations BMI (Body Mass Index)          76 98  F (36.7  C) (Tympanic) 16 25.58 kg/m2         Blood Pressure from Last 3 Encounters:   07/27/18 152/80   06/04/18 130/74   04/26/18 134/70    Weight from Last 3 Encounters:   07/27/18 137 lb 9.6 oz (62.4 kg)   06/04/18  136 lb (61.7 kg)   04/26/18 139 lb 3.2 oz (63.1 kg)              We Performed the Following     DEPRESSION ACTION PLAN (DAP)          Today's Medication Changes          These changes are accurate as of 7/27/18  2:33 PM.  If you have any questions, ask your nurse or doctor.               Start taking these medicines.        Dose/Directions    omeprazole 20 MG CR capsule   Commonly known as:  priLOSEC   Used for:  Hoarseness, Cough   Started by:  Kahlil Ramirez MD        Dose:  20 mg   Take 1 capsule (20 mg) by mouth daily   Quantity:  30 capsule   Refills:  1            Where to get your medicines      These medications were sent to [place] [address] [place] [facility] - [address]  [address] [facility] [address]     Phone:  [phone]     omeprazole 20 MG CR capsule                Primary Care Provider Office Phone # Fax #    Usha Johnson [phone]       [address] [address]        Equal Access to Services     [facility]: Hadii aad ku hadasho Soomaali, waaxda luqadaha, qaybta kaalmada adeegyada, waxay idiin hayaan tina jorge . So [facility] [phone].    ATENCIÓN: Si habla español, tiene a bruner disposición servicios gratuitos de asistencia lingüística. Llame al [phone].    We comply with applicable federal civil rights laws and [place] laws. We do not discriminate on the basis of race, color, national origin, age, disability, sex, sexual orientation, or gender identity.            Thank you!     Thank you for choosing [facility]  for your care. Our goal is always to provide you with excellent care. Hearing back from our patients is one way we can continue to improve our services. Please take a few minutes to complete the written survey that you may receive in the mail after your visit with us. Thank you!             Your Updated Medication List - Protect others around you: Learn how to safely  use, store and throw away your medicines at www.disposemymeds.org.          This list is accurate as of 7/27/18  2:33 PM.  Always use your most recent med list.                   Brand Name Dispense Instructions for use Diagnosis    aspirin 81 MG chewable tablet      Take 81 mg by mouth daily        Co Q 10 10 MG Caps      Take 1 capsule by mouth        fish oil-omega-3 fatty acids 1000 MG capsule      Take 1 capsule by mouth        folic acid 20 MG Caps      Take 1 capsule by mouth        glucosamine-chondroitin 500-400 MG Caps per capsule      Take 2 tablets by mouth        levothyroxine 88 MCG tablet    SYNTHROID/LEVOTHROID     Take 88 mcg by mouth daily        lovastatin 20 MG tablet    MEVACOR     Take 20 mg by mouth        Lysine 500 MG Caps      Take 1 capsule by mouth        metoprolol succinate 100 MG 24 hr tablet    TOPROL-XL     Take 100 mg by mouth        MULTI-VITAMINS Tabs      Take 1 tablet by mouth        omeprazole 20 MG CR capsule    priLOSEC    30 capsule    Take 1 capsule (20 mg) by mouth daily    Hoarseness, Cough       Selenium 100 MCG Tabs      Take 1 tablet by mouth        sertraline 100 MG tablet    ZOLOFT     Take 100 mg by mouth daily        VITAMIN C (CALCIUM ASCORBATE) PO      Chew and swallow 1 tablet daily.

## 2018-07-28 ASSESSMENT — PATIENT HEALTH QUESTIONNAIRE - PHQ9: SUM OF ALL RESPONSES TO PHQ QUESTIONS 1-9: 2

## 2018-07-28 ASSESSMENT — ANXIETY QUESTIONNAIRES: GAD7 TOTAL SCORE: 4

## 2018-08-12 ENCOUNTER — COMMUNICATION - HEALTHEAST (OUTPATIENT)
Dept: FAMILY MEDICINE | Facility: CLINIC | Age: 83
End: 2018-08-12

## 2018-08-12 DIAGNOSIS — I10 ESSENTIAL HYPERTENSION: ICD-10-CM

## 2018-10-02 ENCOUNTER — HOSPITAL ENCOUNTER (EMERGENCY)
Facility: CLINIC | Age: 83
Discharge: HOME | End: 2018-10-02
Attending: FAMILY MEDICINE | Admitting: FAMILY MEDICINE
Payer: COMMERCIAL

## 2018-10-02 ENCOUNTER — RECORDS - HEALTHEAST (OUTPATIENT)
Dept: ADMINISTRATIVE | Facility: OTHER | Age: 83
End: 2018-10-02

## 2018-10-02 VITALS
TEMPERATURE: 98.1 F | DIASTOLIC BLOOD PRESSURE: 83 MMHG | RESPIRATION RATE: 16 BRPM | SYSTOLIC BLOOD PRESSURE: 150 MMHG | BODY MASS INDEX: 26 KG/M2 | OXYGEN SATURATION: 94 % | HEIGHT: 61 IN

## 2018-10-02 DIAGNOSIS — I10 BENIGN ESSENTIAL HYPERTENSION: ICD-10-CM

## 2018-10-02 LAB
ANION GAP SERPL CALCULATED.3IONS-SCNC: 7 MMOL/L (ref 3–14)
BASOPHILS # BLD AUTO: 0 10E9/L (ref 0–0.2)
BASOPHILS NFR BLD AUTO: 0.4 %
BUN SERPL-MCNC: 13 MG/DL (ref 7–30)
CALCIUM SERPL-MCNC: 8.5 MG/DL (ref 8.5–10.1)
CHLORIDE SERPL-SCNC: 100 MMOL/L (ref 94–109)
CO2 SERPL-SCNC: 29 MMOL/L (ref 20–32)
CREAT SERPL-MCNC: 0.66 MG/DL (ref 0.52–1.04)
DIFFERENTIAL METHOD BLD: NORMAL
EOSINOPHIL # BLD AUTO: 0.3 10E9/L (ref 0–0.7)
EOSINOPHIL NFR BLD AUTO: 5.7 %
ERYTHROCYTE [DISTWIDTH] IN BLOOD BY AUTOMATED COUNT: 12 % (ref 10–15)
GFR SERPL CREATININE-BSD FRML MDRD: 86 ML/MIN/1.7M2
GLUCOSE SERPL-MCNC: 93 MG/DL (ref 70–99)
HCT VFR BLD AUTO: 35.7 % (ref 35–47)
HGB BLD-MCNC: 12.3 G/DL (ref 11.7–15.7)
IMM GRANULOCYTES # BLD: 0 10E9/L (ref 0–0.4)
IMM GRANULOCYTES NFR BLD: 0.2 %
LYMPHOCYTES # BLD AUTO: 2.2 10E9/L (ref 0.8–5.3)
LYMPHOCYTES NFR BLD AUTO: 44.1 %
MCH RBC QN AUTO: 29.9 PG (ref 26.5–33)
MCHC RBC AUTO-ENTMCNC: 34.5 G/DL (ref 31.5–36.5)
MCV RBC AUTO: 87 FL (ref 78–100)
MONOCYTES # BLD AUTO: 0.6 10E9/L (ref 0–1.3)
MONOCYTES NFR BLD AUTO: 11.5 %
NEUTROPHILS # BLD AUTO: 1.9 10E9/L (ref 1.6–8.3)
NEUTROPHILS NFR BLD AUTO: 38.1 %
NRBC # BLD AUTO: 0 10*3/UL
NRBC BLD AUTO-RTO: 0 /100
PLATELET # BLD AUTO: 213 10E9/L (ref 150–450)
POTASSIUM SERPL-SCNC: 4.1 MMOL/L (ref 3.4–5.3)
RBC # BLD AUTO: 4.11 10E12/L (ref 3.8–5.2)
SODIUM SERPL-SCNC: 136 MMOL/L (ref 133–144)
WBC # BLD AUTO: 4.9 10E9/L (ref 4–11)

## 2018-10-02 PROCEDURE — 93005 ELECTROCARDIOGRAM TRACING: CPT | Performed by: FAMILY MEDICINE

## 2018-10-02 PROCEDURE — 99284 EMERGENCY DEPT VISIT MOD MDM: CPT | Performed by: FAMILY MEDICINE

## 2018-10-02 PROCEDURE — 80048 BASIC METABOLIC PNL TOTAL CA: CPT | Performed by: FAMILY MEDICINE

## 2018-10-02 PROCEDURE — 85025 COMPLETE CBC W/AUTO DIFF WBC: CPT | Performed by: FAMILY MEDICINE

## 2018-10-02 PROCEDURE — 99284 EMERGENCY DEPT VISIT MOD MDM: CPT | Mod: 25 | Performed by: FAMILY MEDICINE

## 2018-10-02 PROCEDURE — 93010 ELECTROCARDIOGRAM REPORT: CPT | Mod: Z6 | Performed by: FAMILY MEDICINE

## 2018-10-02 NOTE — ED NOTES
"Pt reports she felt \"flushed\" at home a decided to take her blood pressure at home and it \"was as high as 150\". Pt also reports that she was scared and took an extra metoprolol before coming to the ER. Pt denies headache or neurological changes. Pt reports she normally takes her BP daily but hadn't for about a week until today.   "

## 2018-10-02 NOTE — ED PROVIDER NOTES
History     Chief Complaint   Patient presents with     Hypertension     reports reading of 150s/100 at home     HPI  oPlina Fernandez is a 83 year old female, past medical history significant for hyponatremia, depression, anxiety, hypothyroidism, hypertension, hyperlipidemia, presents to the emergency department with concerns of hypertension at home.  History is obtained from the patient who states that she had gone to bed and awoke feeling somewhat flushed approximately 1 hour prior to presentation.  She took her blood pressure because she did not know what to do and found it to be elevated in the 150s systolic and over 100 diastolic.  She denied other symptoms such as headache change in vision, chest pain, shortness of air, etc.  She had only the concerns of flushing which has now passed.  She states that she has been checking her blood pressure over the past couple of weeks and normally her top number is around 130.    Problem List:    Patient Active Problem List    Diagnosis Date Noted     Hyponatremia 04/27/2018     Priority: Medium     Depression with anxiety 04/26/2018     Priority: Medium     Hypothyroidism, unspecified type 04/26/2018     Priority: Medium     Benign essential hypertension 04/26/2018     Priority: Medium     Hyperlipidemia, unspecified hyperlipidemia type 04/26/2018     Priority: Medium        Past Medical History:    Past Medical History:   Diagnosis Date     Hypercholesteremia      Hypertension        Past Surgical History:    Past Surgical History:   Procedure Laterality Date     APPENDECTOMY       HC CONIZATION CERVIX, LOOP ELECTRODE EXCISION  02/01/2005     HC REVISE MEDIAN N/CARPAL TUNNEL SURG       TONSILLECTOMY         Family History:    Family History   Problem Relation Age of Onset     Lung Cancer Brother      Breast Cancer Sister      Lung Cancer Brother      Breast Cancer Sister      Pancreatic Cancer Sister        Social History:  Marital Status:   [2]  Social  "History   Substance Use Topics     Smoking status: Never Smoker     Smokeless tobacco: Never Used     Alcohol use No        Medications:      metoprolol succinate (TOPROL-XL) 100 MG 24 hr tablet   aspirin 81 MG chewable tablet   Coenzyme Q10 (CO Q 10) 10 MG CAPS   fish oil-omega-3 fatty acids 1000 MG capsule   folic acid 20 MG CAPS   glucosamine-chondroitin 500-400 MG CAPS per capsule   levothyroxine (SYNTHROID/LEVOTHROID) 88 MCG tablet   lovastatin (MEVACOR) 20 MG tablet   Lysine 500 MG CAPS   Multiple Vitamin (MULTI-VITAMINS) TABS   omeprazole (PRILOSEC) 20 MG CR capsule   Selenium 100 MCG TABS   sertraline (ZOLOFT) 100 MG tablet   VITAMIN C, CALCIUM ASCORBATE, PO         Review of Systems   All other systems reviewed and are negative.      Physical Exam   BP: 173/90  Heart Rate: 71  Temp: 98.1  F (36.7  C)  Resp: 16  Height: 154.9 cm (5' 1\")  SpO2: 97 %      Physical Exam   Constitutional: She is oriented to person, place, and time. She appears well-developed and well-nourished.   HENT:   Head: Normocephalic and atraumatic.   Right Ear: External ear normal.   Left Ear: External ear normal.   Nose: Nose normal.   Mouth/Throat: Oropharynx is clear and moist.   Eyes: Conjunctivae and EOM are normal. Pupils are equal, round, and reactive to light.   Neck: Normal range of motion. Neck supple.   Cardiovascular: Normal rate, regular rhythm, normal heart sounds and intact distal pulses.    Pulmonary/Chest: Effort normal and breath sounds normal.   Abdominal: Soft. Bowel sounds are normal.   Musculoskeletal: Normal range of motion.   Neurological: She is alert and oriented to person, place, and time.   Skin: Skin is warm and dry.   Psychiatric: She has a normal mood and affect. Her behavior is normal.   Nursing note and vitals reviewed.      ED Course     ED Course     Procedures               EKG Interpretation:      Interpreted by Gabe Jasmine  Time reviewed: 01:50  Symptoms at time of EKG: none   Rhythm: normal " sinus   Rate: normal  Axis: normal  Ectopy: none  Conduction: normal  ST Segments/ T Waves: No ST-T wave changes  Q Waves: none  Comparison to prior: No old EKG available    Clinical Impression: normal EKG          Critical Care time:  none               Results for orders placed or performed during the hospital encounter of 10/02/18 (from the past 24 hour(s))   CBC with platelets differential   Result Value Ref Range    WBC 4.9 4.0 - 11.0 10e9/L    RBC Count 4.11 3.8 - 5.2 10e12/L    Hemoglobin 12.3 11.7 - 15.7 g/dL    Hematocrit 35.7 35.0 - 47.0 %    MCV 87 78 - 100 fl    MCH 29.9 26.5 - 33.0 pg    MCHC 34.5 31.5 - 36.5 g/dL    RDW 12.0 10.0 - 15.0 %    Platelet Count 213 150 - 450 10e9/L    Diff Method Automated Method     % Neutrophils 38.1 %    % Lymphocytes 44.1 %    % Monocytes 11.5 %    % Eosinophils 5.7 %    % Basophils 0.4 %    % Immature Granulocytes 0.2 %    Nucleated RBCs 0 0 /100    Absolute Neutrophil 1.9 1.6 - 8.3 10e9/L    Absolute Lymphocytes 2.2 0.8 - 5.3 10e9/L    Absolute Monocytes 0.6 0.0 - 1.3 10e9/L    Absolute Eosinophils 0.3 0.0 - 0.7 10e9/L    Absolute Basophils 0.0 0.0 - 0.2 10e9/L    Abs Immature Granulocytes 0.0 0 - 0.4 10e9/L    Absolute Nucleated RBC 0.0    Basic metabolic panel   Result Value Ref Range    Sodium 136 133 - 144 mmol/L    Potassium 4.1 3.4 - 5.3 mmol/L    Chloride 100 94 - 109 mmol/L    Carbon Dioxide 29 20 - 32 mmol/L    Anion Gap 7 3 - 14 mmol/L    Glucose 93 70 - 99 mg/dL    Urea Nitrogen 13 7 - 30 mg/dL    Creatinine 0.66 0.52 - 1.04 mg/dL    GFR Estimate 86 >60 mL/min/1.7m2    GFR Estimate If Black >90 >60 mL/min/1.7m2    Calcium 8.5 8.5 - 10.1 mg/dL       Medications - No data to display  3:17 AM  Lab diagnostics and EKG are reviewed with the patient.  We had a lengthy discussion about elevated blood pressure and numbers to be concerned about and members not to be concerned about the absence of symptoms.  I would like her to follow-up in clinic with your primary  care provider this week and a more controlled and less anxiety provoking circumstances in the emergency department for recheck of her blood pressure.  If it remains elevated adjustment of her medications can be discussed at that point.    Assessments & Plan (with Medical Decision Making)   Assessment and plan with medical decision making at the time stamp above.  Disposition to home.    Disclaimer: This note consists of symbols derived from keyboarding, dictation and/or voice recognition software. As a result, there may be errors in the script that have gone undetected. Please consider this when interpreting information found in this chart.      I have reviewed the nursing notes.    I have reviewed the findings, diagnosis, plan and need for follow up with the patient.       New Prescriptions    No medications on file       Final diagnoses:   Benign essential hypertension       10/2/2018   [facility] EMERGENCY DEPARTMENT     Gabe Jasmine MD  10/02/18 0318

## 2018-10-02 NOTE — DISCHARGE INSTRUCTIONS
Continue your current blood pressure medications.  Please make an appointment with your primary care provider in clinic for later this week.  Return to the emergency department if you experience symptoms in the context of your high blood pressure as we discussed.

## 2018-10-02 NOTE — ED AVS SNAPSHOT
[facility] Emergency Department    [address] [facility] [address]    Phone:  [phone]    Fax:  [phone]                                       Polina Fernandez   MRN: [id number]    Department:  [facility] Emergency Department   Date of Visit:  10/2/2018           After Visit Summary Signature Page     I have received my discharge instructions, and my questions have been answered. I have discussed any challenges I see with this plan with the nurse or doctor.    ..........................................................................................................................................  Patient/Patient Representative Signature      ..........................................................................................................................................  Patient Representative Print Name and Relationship to Patient    ..................................................               ................................................  Date                                   Time    ..........................................................................................................................................  Reviewed by Signature/Title    ...................................................              ..............................................  Date                                               Time          22EPIC Rev 08/18

## 2018-10-02 NOTE — ED AVS SNAPSHOT
[facility] Emergency Department    [address] [address]    Phone:  [phone]    Fax:  [phone]                                       Polina Fernandez   MRN: [id number]    Department:  [facility] Emergency Department   Date of Visit:  10/2/2018           Patient Information     Date Of Birth          1935        Your diagnoses for this visit were:     Benign essential hypertension        You were seen by Gabe Jasmine MD.      Follow-up Information     Schedule an appointment as soon as possible for a visit with Usha Johnson    Specialty:  Family Practice    Why:  As needed, If symptoms worsen    Contact information:    [facility]  [address] [address] [phone]          Discharge Instructions       Continue your current blood pressure medications.  Please make an appointment with your primary care provider in clinic for later this week.  Return to the emergency department if you experience symptoms in the context of your high blood pressure as we discussed.      24 Hour Appointment Hotline       To make an appointment at any [facility], call [phone] ([phone]). If you don't have a family doctor or clinic, we will help you find one. [facility] clinics are conveniently located to serve the needs of you and your family.             Review of your medicines      Our records show that you are taking the medicines listed below. If these are incorrect, please call your family doctor or clinic.        Dose / Directions Last dose taken    aspirin 81 MG chewable tablet   Dose:  81 mg        Take 81 mg by mouth daily   Refills:  0        Co Q 10 10 MG Caps   Dose:  1 capsule        Take 1 capsule by mouth   Refills:  0        fish oil-omega-3 fatty acids 1000 MG capsule   Dose:  1 capsule        Take 1 capsule by mouth   Refills:  0        folic acid 20 MG Caps   Dose:  1 capsule        Take 1 capsule by mouth    Refills:  0        glucosamine-chondroitin 500-400 MG Caps per capsule   Dose:  2 tablet        Take 2 tablets by mouth   Refills:  0        levothyroxine 88 MCG tablet   Commonly known as:  SYNTHROID/LEVOTHROID   Dose:  88 mcg        Take 88 mcg by mouth daily   Refills:  0        lovastatin 20 MG tablet   Commonly known as:  MEVACOR   Dose:  20 mg        Take 20 mg by mouth   Refills:  0        Lysine 500 MG Caps   Dose:  1 capsule        Take 1 capsule by mouth   Refills:  0        metoprolol succinate 100 MG 24 hr tablet   Commonly known as:  TOPROL-XL   Dose:  100 mg        Take 100 mg by mouth   Refills:  0        MULTI-VITAMINS Tabs   Dose:  1 tablet        Take 1 tablet by mouth   Refills:  0        omeprazole 20 MG CR capsule   Commonly known as:  priLOSEC   Dose:  20 mg   Quantity:  30 capsule        Take 1 capsule (20 mg) by mouth daily   Refills:  1        Selenium 100 MCG Tabs   Dose:  1 tablet        Take 1 tablet by mouth   Refills:  0        sertraline 100 MG tablet   Commonly known as:  ZOLOFT   Dose:  100 mg        Take 100 mg by mouth daily   Refills:  0        VITAMIN C (CALCIUM ASCORBATE) PO        Chew and swallow 1 tablet daily.   Refills:  0                Procedures and tests performed during your visit     Basic metabolic panel    CBC with platelets differential    EKG 12-lead, tracing only      Orders Needing Specimen Collection     None      Pending Results     No orders found from 9/30/2018 to 10/3/2018.            Pending Culture Results     No orders found from 9/30/2018 to 10/3/2018.            Pending Results Instructions     If you had any lab results that were not finalized at the time of your Discharge, you can call the ED Lab Result RN at [phone]. You will be contacted by this team for any positive Lab results or changes in treatment. The nurses are available 7 days a week from 10A to 6:30P.  You can leave a message 24 hours per day and they will return your call.         Test Results From Your Hospital Stay        10/2/2018  1:56 AM      Component Results     Component Value Ref Range & Units Status    WBC 4.9 4.0 - 11.0 10e9/L Final    RBC Count 4.11 3.8 - 5.2 10e12/L Final    Hemoglobin 12.3 11.7 - 15.7 g/dL Final    Hematocrit 35.7 35.0 - 47.0 % Final    MCV 87 78 - 100 fl Final    MCH 29.9 26.5 - 33.0 pg Final    MCHC 34.5 31.5 - 36.5 g/dL Final    RDW 12.0 10.0 - 15.0 % Final    Platelet Count 213 150 - 450 10e9/L Final    Diff Method Automated Method  Final    % Neutrophils 38.1 % Final    % Lymphocytes 44.1 % Final    % Monocytes 11.5 % Final    % Eosinophils 5.7 % Final    % Basophils 0.4 % Final    % Immature Granulocytes 0.2 % Final    Nucleated RBCs 0 0 /100 Final    Absolute Neutrophil 1.9 1.6 - 8.3 10e9/L Final    Absolute Lymphocytes 2.2 0.8 - 5.3 10e9/L Final    Absolute Monocytes 0.6 0.0 - 1.3 10e9/L Final    Absolute Eosinophils 0.3 0.0 - 0.7 10e9/L Final    Absolute Basophils 0.0 0.0 - 0.2 10e9/L Final    Abs Immature Granulocytes 0.0 0 - 0.4 10e9/L Final    Absolute Nucleated RBC 0.0  Final         10/2/2018  2:17 AM      Component Results     Component Value Ref Range & Units Status    Sodium 136 133 - 144 mmol/L Final    Potassium 4.1 3.4 - 5.3 mmol/L Final    Chloride 100 94 - 109 mmol/L Final    Carbon Dioxide 29 20 - 32 mmol/L Final    Anion Gap 7 3 - 14 mmol/L Final    Glucose 93 70 - 99 mg/dL Final    Urea Nitrogen 13 7 - 30 mg/dL Final    Creatinine 0.66 0.52 - 1.04 mg/dL Final    GFR Estimate 86 >60 mL/min/1.7m2 Final    Non  GFR Calc    GFR Estimate If Black >90 >60 mL/min/1.7m2 Final    African American GFR Calc    Calcium 8.5 8.5 - 10.1 mg/dL Final                Thank you for choosing [facility]       Thank you for choosing [facility] for your care. Our goal is always to provide you with excellent care. Hearing back from our patients is one way we can continue to improve our services. Please take a few minutes to complete the written  "survey that you may receive in the mail after you visit with us. Thank you!        [employer] Information     [employer] lets you send messages to your doctor, view your test results, renew your prescriptions, schedule appointments and more. To sign up, go to [email or web address] . Click on \"Log in\" on the left side of the screen, which will take you to the Welcome page. Then click on \"Sign up Now\" on the right side of the page.     You will be asked to enter the access code listed below, as well as some personal information. Please follow the directions to create your username and password.     Your access code is: [email or web address]  Expires: 2018  3:19 AM     Your access code will  in 90 days. If you need help or a new code, please call your [facility] clinic or [phone].        Care EveryWhere ID     This is your Care EveryWhere ID. This could be used by other organizations to access your [facility] medical records  [id number]        Equal Access to Services     DANIA HA : Hadlincoln alicia Sotony, waaxda luqadaha, qaybta kaalvera zuñiga, pippa jorge . So [facility] [phone].    ATENCIÓN: Si habla español, tiene a bruner disposición servicios gratuitos de asistencia lingüística. Llame al [phone].    We comply with applicable federal civil rights laws and [place] laws. We do not discriminate on the basis of race, color, national origin, age, disability, sex, sexual orientation, or gender identity.            After Visit Summary       This is your record. Keep this with you and show to your community pharmacist(s) and doctor(s) at your next visit.                  "

## 2018-10-16 ENCOUNTER — TELEPHONE (OUTPATIENT)
Dept: FAMILY MEDICINE | Facility: CLINIC | Age: 83
End: 2018-10-16

## 2018-10-16 NOTE — TELEPHONE ENCOUNTER
I am reviewing charts today and noticed that your blood pressure was high at your last visit in clinic. We are concerned about providing good health care. If you check your blood pressure at home, please send me a few readings.  If not, you can schedule an appointment with the clinic RN for a blood pressure check.  You can call [phone] to schedule.  There is no charge for the blood pressure check in clinic.     left message for patient to return call.  Pau Moses RN

## 2018-10-16 NOTE — TELEPHONE ENCOUNTER
Patient called back. I informed her of below that we would like her to come in for a BP check or bring her readings in. She did not want to schedule anything at this time.    Zahra Davies-Station

## 2018-10-18 ENCOUNTER — COMMUNICATION - HEALTHEAST (OUTPATIENT)
Dept: FAMILY MEDICINE | Facility: CLINIC | Age: 83
End: 2018-10-18

## 2018-10-18 DIAGNOSIS — F34.1 DYSTHYMIC DISORDER: ICD-10-CM

## 2018-10-24 ENCOUNTER — TELEPHONE (OUTPATIENT)
Dept: FAMILY MEDICINE | Facility: CLINIC | Age: 83
End: 2018-10-24

## 2018-10-25 ENCOUNTER — ALLIED HEALTH/NURSE VISIT (OUTPATIENT)
Dept: FAMILY MEDICINE | Facility: CLINIC | Age: 83
End: 2018-10-25
Payer: COMMERCIAL

## 2018-10-25 DIAGNOSIS — Z23 NEED FOR PROPHYLACTIC VACCINATION AND INOCULATION AGAINST INFLUENZA: Primary | ICD-10-CM

## 2018-10-25 PROCEDURE — 99207 ZZC NO CHARGE NURSE ONLY: CPT

## 2018-10-25 PROCEDURE — 90662 IIV NO PRSV INCREASED AG IM: CPT

## 2018-10-25 PROCEDURE — G0008 ADMIN INFLUENZA VIRUS VAC: HCPCS

## 2018-10-25 NOTE — MR AVS SNAPSHOT
"              After Visit Summary   10/25/2018    Polina Fernandez    MRN: [id number]           Patient Information     Date Of Birth          1935        Visit Information        Provider Department      10/25/2018 2:15 PM [place] NB RN [facility]        Today's Diagnoses     Need for prophylactic vaccination and inoculation against influenza    -  1       Follow-ups after your visit        Who to contact     If you have questions or need follow up information about today's clinic visit or your schedule please contact [facility] directly at [phone].  Normal or non-critical lab and imaging results will be communicated to you by angelMDhart, letter or phone within 4 business days after the clinic has received the results. If you do not hear from us within 7 days, please contact the clinic through VoipSwitcht or phone. If you have a critical or abnormal lab result, we will notify you by phone as soon as possible.  Submit refill requests through iFulfillment or call your pharmacy and they will forward the refill request to us. Please allow 3 business days for your refill to be completed.          Additional Information About Your Visit        MyChart Information     iFulfillment lets you send messages to your doctor, view your test results, renew your prescriptions, schedule appointments and more. To sign up, go to [email or web address] . Click on \"Log in\" on the left side of the screen, which will take you to the Welcome page. Then click on \"Sign up Now\" on the right side of the page.     You will be asked to enter the access code listed below, as well as some personal information. Please follow the directions to create your username and password.     Your access code is: [email or web address]  Expires: 2018  3:19 AM     Your access code will  in 90 days. If you need help or a new code, please call your [facility] or [phone].        Care EveryWhere ID     This is your Care " EveryWhere ID. This could be used by other organizations to access your [place] medical records  [id number]         Blood Pressure from Last 3 Encounters:   10/02/18 150/83   07/27/18 152/80   06/04/18 130/74    Weight from Last 3 Encounters:   07/27/18 137 lb 9.6 oz (62.4 kg)   06/04/18 136 lb (61.7 kg)   04/26/18 139 lb 3.2 oz (63.1 kg)              We Performed the Following     ADMIN INFLUENZA (For MEDICARE Patients ONLY) []     FLU VACCINE, INCREASED ANTIGEN, PRESV FREE, AGE 65+ [04170]        Primary Care Provider Office Phone # Fax #    Usha GILL Elizabeth [phone]       [facility] [address] [address]        Equal Access to Services     DANIA HA : Hadii aad ku hadasho Soomaali, waaxda luqadaha, qaybta kaalmada zara, pippa nicole. So [facility] [phone].    ATENCIÓN: Si habla español, tiene a bruner disposición servicios gratuitos de asistencia lingüística. Héctor al [phone].    We comply with applicable federal civil rights laws and [place] laws. We do not discriminate on the basis of race, color, national origin, age, disability, sex, sexual orientation, or gender identity.            Thank you!     Thank you for choosing [facility]  for your care. Our goal is always to provide you with excellent care. Hearing back from our patients is one way we can continue to improve our services. Please take a few minutes to complete the written survey that you may receive in the mail after your visit with us. Thank you!             Your Updated Medication List - Protect others around you: Learn how to safely use, store and throw away your medicines at www.disposemymeds.org.          This list is accurate as of 10/25/18  2:37 PM.  Always use your most recent med list.                   Brand Name Dispense Instructions for use Diagnosis    aspirin 81 MG chewable tablet      Take 81 mg by mouth daily        Co  Q 10 10 MG Caps      Take 1 capsule by mouth        fish oil-omega-3 fatty acids 1000 MG capsule      Take 1 capsule by mouth        folic acid 20 MG Caps      Take 1 capsule by mouth        glucosamine-chondroitin 500-400 MG Caps per capsule      Take 2 tablets by mouth        levothyroxine 88 MCG tablet    SYNTHROID/LEVOTHROID     Take 88 mcg by mouth daily        lovastatin 20 MG tablet    MEVACOR     Take 20 mg by mouth        Lysine 500 MG Caps      Take 1 capsule by mouth        metoprolol succinate 100 MG 24 hr tablet    TOPROL-XL     Take 100 mg by mouth        MULTI-VITAMINS Tabs      Take 1 tablet by mouth        omeprazole 20 MG CR capsule    priLOSEC    30 capsule    Take 1 capsule (20 mg) by mouth daily    Hoarseness, Cough       Selenium 100 MCG Tabs      Take 1 tablet by mouth        sertraline 100 MG tablet    ZOLOFT     Take 100 mg by mouth daily        VITAMIN C (CALCIUM ASCORBATE) PO      Chew and swallow 1 tablet daily.

## 2018-10-25 NOTE — NURSING NOTE
Polina Fernandez is a 83 year old year old patient who comes in today for a Blood Pressure check because of ongoing blood pressure monitoring.  Vital Signs as repeated by RN   Patient is taking medication as prescribed  Patient is tolerating medications well.  Patient is monitoring Blood Pressure at home.  Average readings if yes are 120-130-70  Current complaints: none  Disposition:  patient to continue with the same medication

## 2018-11-02 ENCOUNTER — COMMUNICATION - HEALTHEAST (OUTPATIENT)
Dept: FAMILY MEDICINE | Facility: CLINIC | Age: 83
End: 2018-11-02

## 2018-11-02 DIAGNOSIS — E78.00 HYPERCHOLESTEREMIA: ICD-10-CM

## 2018-11-15 ENCOUNTER — COMMUNICATION - HEALTHEAST (OUTPATIENT)
Dept: FAMILY MEDICINE | Facility: CLINIC | Age: 83
End: 2018-11-15

## 2018-11-15 DIAGNOSIS — I10 ESSENTIAL HYPERTENSION: ICD-10-CM

## 2018-11-26 ENCOUNTER — AMBULATORY - HEALTHEAST (OUTPATIENT)
Dept: SCHEDULING | Facility: CLINIC | Age: 83
End: 2018-11-26

## 2018-11-26 ENCOUNTER — RECORDS - HEALTHEAST (OUTPATIENT)
Dept: GENERAL RADIOLOGY | Facility: CLINIC | Age: 83
End: 2018-11-26

## 2018-11-26 ENCOUNTER — OFFICE VISIT - HEALTHEAST (OUTPATIENT)
Dept: FAMILY MEDICINE | Facility: CLINIC | Age: 83
End: 2018-11-26

## 2018-11-26 DIAGNOSIS — L21.9 SEBORRHEIC DERMATITIS: ICD-10-CM

## 2018-11-26 DIAGNOSIS — M25.552 HIP PAIN, LEFT: ICD-10-CM

## 2018-11-26 DIAGNOSIS — M89.9 DISORDER OF BONE AND CARTILAGE: ICD-10-CM

## 2018-11-26 DIAGNOSIS — E78.00 PURE HYPERCHOLESTEROLEMIA: ICD-10-CM

## 2018-11-26 DIAGNOSIS — J31.0 CHRONIC RHINITIS: ICD-10-CM

## 2018-11-26 DIAGNOSIS — I10 BENIGN ESSENTIAL HYPERTENSION: ICD-10-CM

## 2018-11-26 DIAGNOSIS — F34.1 DYSTHYMIC DISORDER: ICD-10-CM

## 2018-11-26 DIAGNOSIS — R53.81 PHYSICAL DECONDITIONING: ICD-10-CM

## 2018-11-26 DIAGNOSIS — M94.9 DISORDER OF BONE AND CARTILAGE: ICD-10-CM

## 2018-11-26 DIAGNOSIS — Z00.00 ENCOUNTER FOR MEDICARE ANNUAL WELLNESS EXAM: ICD-10-CM

## 2018-11-26 DIAGNOSIS — Z00.00 ROUTINE GENERAL MEDICAL EXAMINATION AT A HEALTH CARE FACILITY: ICD-10-CM

## 2018-11-26 DIAGNOSIS — S62.101D CLOSED FRACTURE OF RIGHT WRIST WITH ROUTINE HEALING, SUBSEQUENT ENCOUNTER: ICD-10-CM

## 2018-11-26 DIAGNOSIS — E03.9 HYPOTHYROIDISM, UNSPECIFIED TYPE: ICD-10-CM

## 2018-11-26 DIAGNOSIS — Z91.81 PERSONAL HISTORY OF FALL: ICD-10-CM

## 2018-11-26 DIAGNOSIS — M25.552 PAIN IN LEFT HIP: ICD-10-CM

## 2018-11-26 LAB
ALBUMIN SERPL-MCNC: 4.1 G/DL (ref 3.5–5)
ALP SERPL-CCNC: 90 U/L (ref 45–120)
ALT SERPL W P-5'-P-CCNC: 23 U/L (ref 0–45)
ANION GAP SERPL CALCULATED.3IONS-SCNC: 10 MMOL/L (ref 5–18)
AST SERPL W P-5'-P-CCNC: 27 U/L (ref 0–40)
BILIRUB SERPL-MCNC: 0.6 MG/DL (ref 0–1)
BUN SERPL-MCNC: 8 MG/DL (ref 8–28)
CALCIUM SERPL-MCNC: 9.6 MG/DL (ref 8.5–10.5)
CHLORIDE BLD-SCNC: 99 MMOL/L (ref 98–107)
CHOLEST SERPL-MCNC: 184 MG/DL
CO2 SERPL-SCNC: 26 MMOL/L (ref 22–31)
CREAT SERPL-MCNC: 0.67 MG/DL (ref 0.6–1.1)
FASTING STATUS PATIENT QL REPORTED: YES
GFR SERPL CREATININE-BSD FRML MDRD: >60 ML/MIN/1.73M2
GLUCOSE BLD-MCNC: 95 MG/DL (ref 70–125)
HDLC SERPL-MCNC: 61 MG/DL
LDLC SERPL CALC-MCNC: 105 MG/DL
POTASSIUM BLD-SCNC: 4.4 MMOL/L (ref 3.5–5)
PROT SERPL-MCNC: 6.9 G/DL (ref 6–8)
SODIUM SERPL-SCNC: 135 MMOL/L (ref 136–145)
TRIGL SERPL-MCNC: 91 MG/DL
TSH SERPL DL<=0.005 MIU/L-ACNC: 3.31 UIU/ML (ref 0.3–5)

## 2018-11-26 ASSESSMENT — MIFFLIN-ST. JEOR: SCORE: 1033.95

## 2018-11-27 ENCOUNTER — COMMUNICATION - HEALTHEAST (OUTPATIENT)
Dept: FAMILY MEDICINE | Facility: CLINIC | Age: 83
End: 2018-11-27

## 2018-11-27 ENCOUNTER — AMBULATORY - HEALTHEAST (OUTPATIENT)
Dept: FAMILY MEDICINE | Facility: CLINIC | Age: 83
End: 2018-11-27

## 2018-11-28 ENCOUNTER — COMMUNICATION - HEALTHEAST (OUTPATIENT)
Dept: FAMILY MEDICINE | Facility: CLINIC | Age: 83
End: 2018-11-28

## 2018-11-29 ENCOUNTER — COMMUNICATION - HEALTHEAST (OUTPATIENT)
Dept: FAMILY MEDICINE | Facility: CLINIC | Age: 83
End: 2018-11-29

## 2018-12-11 ENCOUNTER — COMMUNICATION - HEALTHEAST (OUTPATIENT)
Dept: FAMILY MEDICINE | Facility: CLINIC | Age: 83
End: 2018-12-11

## 2018-12-13 ENCOUNTER — COMMUNICATION - HEALTHEAST (OUTPATIENT)
Dept: FAMILY MEDICINE | Facility: CLINIC | Age: 83
End: 2018-12-13

## 2018-12-14 ENCOUNTER — AMBULATORY - HEALTHEAST (OUTPATIENT)
Dept: LAB | Facility: CLINIC | Age: 83
End: 2018-12-14

## 2018-12-14 DIAGNOSIS — S62.101D CLOSED FRACTURE OF RIGHT WRIST WITH ROUTINE HEALING, SUBSEQUENT ENCOUNTER: ICD-10-CM

## 2018-12-14 DIAGNOSIS — M89.9 DISORDER OF BONE AND CARTILAGE: ICD-10-CM

## 2018-12-14 DIAGNOSIS — M94.9 DISORDER OF BONE AND CARTILAGE: ICD-10-CM

## 2018-12-14 LAB
CALCIUM SERPL-MCNC: 9.4 MG/DL (ref 8.5–10.5)
CREAT SERPL-MCNC: 0.7 MG/DL (ref 0.6–1.1)
GFR SERPL CREATININE-BSD FRML MDRD: >60 ML/MIN/1.73M2
PHOSPHATE SERPL-MCNC: 3 MG/DL (ref 2.5–4.5)
PTH-INTACT SERPL-MCNC: 43 PG/ML (ref 10–86)

## 2018-12-17 LAB — 25(OH)D3 SERPL-MCNC: 57.1 NG/ML (ref 30–80)

## 2018-12-18 ENCOUNTER — AMBULATORY - HEALTHEAST (OUTPATIENT)
Dept: LAB | Facility: CLINIC | Age: 83
End: 2018-12-18

## 2018-12-18 DIAGNOSIS — M94.9 DISORDER OF BONE AND CARTILAGE: ICD-10-CM

## 2018-12-18 DIAGNOSIS — M89.9 DISORDER OF BONE AND CARTILAGE: ICD-10-CM

## 2018-12-18 DIAGNOSIS — S62.101D CLOSED FRACTURE OF RIGHT WRIST WITH ROUTINE HEALING, SUBSEQUENT ENCOUNTER: ICD-10-CM

## 2018-12-18 LAB
ALBUMIN PERCENT: 61 % (ref 51–67)
ALBUMIN SERPL ELPH-MCNC: 4 G/DL (ref 3.2–4.7)
ALPHA 1 PERCENT: 2.8 % (ref 2–4)
ALPHA 2 PERCENT: 12.5 % (ref 5–13)
ALPHA1 GLOB SERPL ELPH-MCNC: 0.2 G/DL (ref 0.1–0.3)
ALPHA2 GLOB SERPL ELPH-MCNC: 0.8 G/DL (ref 0.4–0.9)
B-GLOBULIN SERPL ELPH-MCNC: 0.7 G/DL (ref 0.7–1.2)
BETA PERCENT: 11 % (ref 10–17)
CALCIUM 24H UR-MRATE: 214 MG/24HR (ref 20–275)
CALCIUM UR-MCNC: 11.7 MG/DL
GAMMA GLOB SERPL ELPH-MCNC: 0.8 G/DL (ref 0.6–1.4)
GAMMA GLOBULIN PERCENT: 12.7 % (ref 9–20)
PATH ICD:: NORMAL
PROT PATTERN SERPL ELPH-IMP: NORMAL
PROT SERPL-MCNC: 6.5 G/DL (ref 6–8)
REVIEWING PATHOLOGIST: NORMAL
SPECIMEN VOL UR: 1825 ML

## 2018-12-19 ENCOUNTER — AMBULATORY - HEALTHEAST (OUTPATIENT)
Dept: FAMILY MEDICINE | Facility: CLINIC | Age: 83
End: 2018-12-19

## 2018-12-19 ENCOUNTER — COMMUNICATION - HEALTHEAST (OUTPATIENT)
Dept: FAMILY MEDICINE | Facility: CLINIC | Age: 83
End: 2018-12-19

## 2018-12-19 DIAGNOSIS — M85.80 OSTEOPENIA WITH HIGH RISK OF FRACTURE: ICD-10-CM

## 2019-01-18 ENCOUNTER — HOSPITAL ENCOUNTER (OUTPATIENT)
Dept: MAMMOGRAPHY | Facility: CLINIC | Age: 84
Discharge: HOME | End: 2019-01-18
Attending: FAMILY MEDICINE

## 2019-01-18 DIAGNOSIS — Z12.31 VISIT FOR SCREENING MAMMOGRAM: ICD-10-CM

## 2019-01-23 ENCOUNTER — COMMUNICATION - HEALTHEAST (OUTPATIENT)
Dept: FAMILY MEDICINE | Facility: CLINIC | Age: 84
End: 2019-01-23

## 2019-01-23 DIAGNOSIS — F34.1 DYSTHYMIC DISORDER: ICD-10-CM

## 2019-02-15 ENCOUNTER — COMMUNICATION - HEALTHEAST (OUTPATIENT)
Dept: FAMILY MEDICINE | Facility: CLINIC | Age: 84
End: 2019-02-15

## 2019-02-15 DIAGNOSIS — E78.00 HYPERCHOLESTEREMIA: ICD-10-CM

## 2019-02-15 DIAGNOSIS — E03.9 HYPOTHYROIDISM, UNSPECIFIED TYPE: ICD-10-CM

## 2019-02-20 ENCOUNTER — TELEPHONE (OUTPATIENT)
Dept: FAMILY MEDICINE | Facility: CLINIC | Age: 84
End: 2019-02-20

## 2019-02-20 NOTE — TELEPHONE ENCOUNTER
left message for patient to return call.  Needs BP check or if checks at home, need readings  Pau Moses RN

## 2019-03-01 ENCOUNTER — COMMUNICATION - HEALTHEAST (OUTPATIENT)
Dept: FAMILY MEDICINE | Facility: CLINIC | Age: 84
End: 2019-03-01

## 2019-03-01 DIAGNOSIS — I10 ESSENTIAL HYPERTENSION: ICD-10-CM

## 2019-03-15 ENCOUNTER — COMMUNICATION - HEALTHEAST (OUTPATIENT)
Dept: FAMILY MEDICINE | Facility: CLINIC | Age: 84
End: 2019-03-15

## 2019-04-16 ENCOUNTER — COMMUNICATION - HEALTHEAST (OUTPATIENT)
Dept: FAMILY MEDICINE | Facility: CLINIC | Age: 84
End: 2019-04-16

## 2019-04-23 ENCOUNTER — OFFICE VISIT - HEALTHEAST (OUTPATIENT)
Dept: FAMILY MEDICINE | Facility: CLINIC | Age: 84
End: 2019-04-23

## 2019-04-23 DIAGNOSIS — M85.80 OSTEOPENIA WITH HIGH RISK OF FRACTURE: ICD-10-CM

## 2019-04-23 DIAGNOSIS — I10 ESSENTIAL HYPERTENSION: ICD-10-CM

## 2019-04-23 DIAGNOSIS — F34.1 DYSTHYMIC DISORDER: ICD-10-CM

## 2019-04-23 DIAGNOSIS — Z78.9 VEGETARIAN DIET: ICD-10-CM

## 2019-04-23 DIAGNOSIS — E78.00 PURE HYPERCHOLESTEROLEMIA: ICD-10-CM

## 2019-04-23 DIAGNOSIS — Z00.00 HEALTH CARE MAINTENANCE: ICD-10-CM

## 2019-04-23 ASSESSMENT — MIFFLIN-ST. JEOR: SCORE: 990.41

## 2019-06-10 ENCOUNTER — ALLIED HEALTH/NURSE VISIT (OUTPATIENT)
Dept: FAMILY MEDICINE | Facility: CLINIC | Age: 84
End: 2019-06-10
Payer: COMMERCIAL

## 2019-06-10 DIAGNOSIS — H53.9 VISION CHANGES: Primary | ICD-10-CM

## 2019-06-10 PROCEDURE — 99207 ZZC NO CHARGE NURSE ONLY: CPT

## 2019-06-10 NOTE — PROGRESS NOTES
S-(situation): Patient is walk in to clinic with C/O losing visual field last night.  States when held paper in front of eyes lost sight of letters on right side.  She went to lay down and after sleeping vision was better.    B-(background): she said this happened a few months ago    A-(assessment): vision problems    R-(recommendations): advised ED if worse or happens again, because not sure if this is eye problem or heart or head.  See eye doctor soon  Pau Moses RN

## 2019-06-24 ENCOUNTER — ALLIED HEALTH/NURSE VISIT (OUTPATIENT)
Dept: FAMILY MEDICINE | Facility: CLINIC | Age: 84
End: 2019-06-24
Payer: COMMERCIAL

## 2019-06-24 VITALS — SYSTOLIC BLOOD PRESSURE: 136 MMHG | DIASTOLIC BLOOD PRESSURE: 73 MMHG | HEART RATE: 66 BPM

## 2019-06-24 DIAGNOSIS — I10 BENIGN ESSENTIAL HYPERTENSION: Primary | ICD-10-CM

## 2019-06-24 PROCEDURE — 99207 ZZC NO CHARGE NURSE ONLY: CPT | Performed by: FAMILY MEDICINE

## 2019-06-24 NOTE — PROGRESS NOTES
BP and HR abstracted from appt with Usha Johnson at [facility] on 4/23/19.      Janay Floyd, CMA

## 2019-10-18 ENCOUNTER — NURSE TRIAGE (OUTPATIENT)
Dept: NURSING | Facility: CLINIC | Age: 84
End: 2019-10-18

## 2019-10-18 NOTE — TELEPHONE ENCOUNTER
Patient calling about vaginal bleeding that started about 1 hour ago. Is postmenopause. No other symptoms. Reviewed symptoms to be seen for.     Reason for Disposition    Postmenopausal vaginal bleeding    Additional Information    Negative: Shock suspected (e.g., cold/pale/clammy skin, too weak to stand, low BP, rapid pulse)    Negative: Difficult to awaken or acting confused (e.g., disoriented, slurred speech)    Negative: Passed out (i.e., lost consciousness, collapsed and was not responding)    Negative: Sounds like a life-threatening emergency to the triager    Negative: Followed a genital area injury    Negative: [1] Vaginal discharge is main symptom AND [2] small amount of blood    Negative: SEVERE abdominal pain    Negative: SEVERE dizziness (e.g., unable to stand, requires support to walk, feels like passing out now)    Negative: Sexual assault or rape    Negative: SEVERE vaginal bleeding (i.e., soaking 2 pads or tampons per hour and present 2 or more hours)    Negative: Patient sounds very sick or weak to the triager    Negative: MODERATE vaginal bleeding (i.e., soaking 1 pad or tampon per hour and present > 6 hours)    Negative: Pale skin (pallor) of new onset or worsening    Negative: [1] Constant abdominal pain AND [2] present > 2 hours    Negative: Taking Coumadin (warfarin) or other strong blood thinner, or known bleeding disorder (e.g., thrombocytopenia)    Negative: Bleeding with > 6 soaked pads or tampons per day    Negative: Bleeding lasts for > 7 days    Negative: [1] Bleeding/spotting after procedure (e.g., biopsy) or pelvic examination (e.g., pap smear) AND [2] lasts > 7 days    Protocols used: VAGINAL BLEEDING - [id number]

## 2021-05-28 NOTE — PROGRESS NOTES
"  ASSESSMENT/PLAN  1. Hypercholesterolemia  Discussed with patient pros/cons of continuing on statin medication and aspirin given no history of coronary artery disease or diabetes.  Patient had already discontinued her aspirin and elects to stop her statin.  We discussed the fact she can recheck her cholesterol here in a couple months if she would like to see what it looks like off medication.  Lab order completed.  - Lipid Cascade; Future    2. Depression With Anxiety  Patient self weaned off her sertraline and has been off it now over a month.  She feels mood is good and PHQ 9 = 1 today.  She will continue to monitor symptoms and follow-up with any concerns with recurrence.    3. Essential hypertension  Initial blood pressure high.  Repeat looked better at 136/73.  Continue with metoprolol and lifestyle changes including improve diet, exercise.    4. Vegetarian diet  Patient 3 months into a new diet.  Consider B12, hemoglobin/ferritin labs in the future.  We discussed her diet and the importance of making sure she is getting adequate calcium and protein.    5. Health care maintenance  Medication list reviewed and reconciled including discontinued sertraline, aspirin, statin as discussed today.  Also noted she never started her alendronate.    Review of chart shows bone density was completed in November 2018 secondary workup -1218 and she was advised to start on alendronate.  She did not start this.  DEXA showed osteopenia with high fracture risk and this is reviewed with patient and she declines medication at this time.  We review her multiple supplements--most of which have no probable benefit but do reviewed the importance of adequate calcium and D.  Immunizations up-to-date.  She declines shingles vaccine.  Advance directives discussed today.  She reports copy on file is\" very old\".  Copy of honoring choices given to patient to review/complete.  She is encouraged to contact us with any questions and return to " complete a copy to us.      I have had an Advance Directives discussion with the patient.    Pt states an understanding and agrees to the above plan.  Return in about 6 months (around 10/23/2019) for Recheck, hypertension, thyroid, cholesterol, osteoporosis.    Much or all of the text in this note was generated through the use of Dragon Dictate voice-to-text software. Errors in spelling or words which seem out of context are unintentional.   Sound alike errors, in particular, may have escaped editing.        SUBJECTIVE:   Chief Complaint   Patient presents with     Hypertension     Fasting     Polina Fernandez 84 y.o. female    Current Outpatient Medications   Medication Sig Dispense Refill     ASCORBIC ACID (CHEWABLE VITAMIN C ORAL) Chew and swallow 1 tablet daily.       calcium carbonate/vitamin D3 (CALCIUM 500 + D ORAL) Chew and swallow 1 tablet daily.       CALCIUM-MAGNESIUM-VITAMIN D2 ORAL Take 2 capsules by mouth daily.       coenzyme Q10 10 mg capsule Take 1 capsule by mouth.       FOLIC ACID ORAL Take 1 capsule by mouth daily.       glucosamine-chondroitin 500-400 mg cap Take 2 tablets by mouth.       GLUCOSAMINE/CHONDROITIN SULF A (GLUCOSAMINE-CHONDROITIN ORAL) Take 2 tablets by mouth daily.        levothyroxine (SYNTHROID, LEVOTHROID) 88 MCG tablet Take 1 tablet (88 mcg total) by mouth daily. 90 tablet 2     lysine 500 mg cap Take 1 capsule by mouth daily.       metoprolol succinate (TOPROL-XL) 100 MG 24 hr tablet Take 1 tablet (100 mg total) by mouth daily. 90 tablet 2     omega-3 acid ethyl esters (LOVAZA) 1 gram capsule Take 1 capsule by mouth.       omega-3 fatty acids-vitamin E (FISH OIL) 1,000 mg cap Take 1 capsule by mouth daily.       selenium 100 mcg Tab Take 1 tablet by mouth daily. As directed.       UBIDECARENONE (CO Q-10 ORAL) Take 1 capsule by mouth daily.       No current facility-administered medications for this visit.      Allergies: Lisinopril   No LMP recorded. Patient is  "postmenopausal.    HPI:   Patient in for follow-up regarding her high blood pressure today.  Patient reports compliance with current medication metoprolol and no side effects or concerns.  She denies need for any medication refills.  She has no complaints of chest pain, shortness of breath, lower extremity edema, headaches, vision changes.  Her blood pressure today initially elevated, decrease is on recheck.  She reports outside clinic she had it checked and it was in the 120/70 range.  Patient has a history of some depression but had decided to wean off her sertraline.  She did this over the course of a couple months and has been off her med altogether for at least a month now.  She denies any side effects or concerns.  Last fall, patient had bone density done that showed osteopenia with high fracture risk.  Secondary evaluation completed and negative.  Prescription sent for alendronate but patient did not start and is not interested in starting medication.  No history of fall or fracture.  She is taking calcium and vitamin D.  Patient has changed her diet to vegetarian diet over the last 3 months.  She reports that she is doing well with this.  She admits she is hoping to get off all prescription medications by improving her diet.  We discussed this today and it would be reasonable for her to do a trial off the statin medication but it is unlikely that she will get off her blood pressure medication or her thyroid medication.  She does take multiple supplements most of which she is unsure why she takes other than they are\" healthy for her\".     ROS: negative except as per HPI    OBJECTIVE:   The patient appears well, alert, oriented x 3, in no distress.  /73 (Patient Site: Left Arm, Patient Position: Sitting, Cuff Size: Adult Regular)   Pulse 66   Temp (!) 95.9  F (35.5  C) (Oral)   Resp 14   Ht 5' 1.5\" (1.562 m)   Wt 133 lb 6.4 oz (60.5 kg)   BMI 24.80 kg/m     10 pound weight loss since her last " visit.  Initial blood pressure 150/72--repeat as above.    Lungs: clear, good air entry, no wheezes, rhonchi or rales.   Cardiac: S1 and S2 normal, no murmurs, regular rate and rhythm.  Carotids without bruit  Extremities: show no edema, normal peripheral pulses.   Skin: clear, dry, no rashes/lesions    25 minutes spent face-to-face with patient, greater than 50% of this in counseling regarding the above, making plans for follow-up care, and medication management.

## 2021-05-29 ENCOUNTER — RECORDS - HEALTHEAST (OUTPATIENT)
Dept: ADMINISTRATIVE | Facility: CLINIC | Age: 86
End: 2021-05-29

## 2021-05-31 VITALS — WEIGHT: 136 LBS | HEIGHT: 62 IN | BODY MASS INDEX: 25.03 KG/M2

## 2021-06-01 ENCOUNTER — RECORDS - HEALTHEAST (OUTPATIENT)
Dept: ADMINISTRATIVE | Facility: CLINIC | Age: 86
End: 2021-06-01

## 2021-06-02 VITALS — HEIGHT: 62 IN | BODY MASS INDEX: 26.31 KG/M2 | WEIGHT: 143 LBS

## 2021-06-02 VITALS — BODY MASS INDEX: 24.55 KG/M2 | HEIGHT: 62 IN | WEIGHT: 133.4 LBS

## 2021-06-03 ENCOUNTER — RECORDS - HEALTHEAST (OUTPATIENT)
Dept: ADMINISTRATIVE | Facility: CLINIC | Age: 86
End: 2021-06-03

## 2021-06-13 NOTE — PROGRESS NOTES
I met with Polina Fernandez at the request of Dr. Johnson to recheck her blood pressure.  Blood pressure medications on the MAR were reviewed with patient.    Patient has taken all medications as per usual regimen: Yes--recently switched to Metoprolol and upped from 50mg--->100mg at recent visit.  Patient reports tolerating them without any issues or concerns: Yes    Vitals:    10/09/17 1437   BP: 120/62   Patient Site: Left Arm   Patient Position: Sitting   Cuff Size: Adult Regular   Pulse: 64       Blood pressure was taken, previous encounter was reviewed, recorded blood pressure below 140/90.  Patient was discharged and the note will be sent to the provider for final review.

## 2021-06-13 NOTE — PROGRESS NOTES
Assessment/Plan:    1. Health care maintenance  Immunizations reviewed and utd--high-dose flu shot today   Colonosocopy reviewed--- normal 2014, no need to repeat secondary to age and asymptomatic.  Mammography reviewed--patient wants to continue to do annually.  Routine Dental and Eye care recommended  Discussed importance of regular exercise and appropriate calcium intake  Discussed Advance Directives--on file.    - Mammo Screening Bilateral; Future    2. Hypercholesteremia  Stable w/ current medication---refill and check routine labs; pt will be advised of results when available  - Lipid Cascade  - lovastatin (MEVACOR) 20 MG tablet; Take 1 tablet (20 mg total) by mouth at bedtime.  Dispense: 90 tablet; Refill: 3    3. Depression With Anxiety  Stable w/ current medication---refill and check routine labs; pt will be advised of results when available  - sertraline (ZOLOFT) 100 MG tablet; Take 1.5 tablets (150 mg total) by mouth daily. Take one and one-half tablets daily.  Dispense: 135 tablet; Refill: 3    4. Osteopenia  Recommend calcium intake (12-1500mg/day) with vitamin D (800-1000 IU daily) as well as weight bearing exercise to include strength and balance.  Calcium supplement should be calcium CITRATE    - DXA Bone Density Scan; Future    5. Hypothyroid  Stable w/ current medication---refill and check routine labs; pt will be advised of results when available    - Thyroid Cascade    6. HTN (hypertension)  Slightly elevated today on initial and recheck.  Increase from 50 up to 100 mg of metoprolol and do a blood pressure check again in a week.  Of note, she was recently changed from atenolol to metoprolol.  - Comprehensive Metabolic Panel    7. Metatarsalgia  Referral done to podiatry for further recommendations.    Pt states an understanding and agrees with the above plan.    I have had an Advance Directives discussion with the patient.  The following high BMI interventions were performed this visit:  encouragement to exercise and weight monitoring            Health Maintenance   Topic Date Due     DXA SCAN  02/17/2000     INFLUENZA VACCINE RULE BASED (1) 08/01/2017     TD 18+ HE  08/05/2018     DEPRESSION FOLLOW UP  03/18/2018     ADVANCE DIRECTIVES DISCUSSED WITH PATIENT  06/12/2018     FALL RISK ASSESSMENT  09/18/2018     PNEUMOCOCCAL POLYSACCHARIDE VACCINE AGE 65 AND OVER  Completed     PNEUMOCOCCAL CONJUGATE VACCINE FOR ADULTS (PCV13 OR PREVNAR)  Completed     ZOSTER VACCINE  Completed         HPI    Patient is a 82 y.o. female presents for a physical exam.  Teresa comes in today fasting for labs.  She is also due for flu shot and bone density.    She has a couple new issues the first is regarding some bilateral foot pain.  She states it is on the ball of her foot bilaterally.  No history of any injury or trauma prolonged standing etc.  She is very active and does not always wear the best shoes.  She comes in today wearing Crocs and  states she wears those frequently.  She has been developing more of a hammertoe on her right foot but states the left foot is actually more painful than the right.  She has not noticed any significant redness or swelling.  She did get an over-the-counter insert from Dr. Vela's which has helped some.  Second issue is regarding some left sciatic pain.  Patient states she has some discomfort from the middle of her buttock that radiates into her leg at times.  Again, no history of injury or trauma.  She has chronic back pain and does regular back stretching and exercises but she does not think that has helped or aggravated this.  She has no symptoms on the opposite side and is not had this previously.  She denies any lower extremity numbness tingling or weakness.  No bowel or bladder changes.  Chronic medical issues reviewed including patient's hypothyroidism, hypertension, depression, elevated cholesterol.  She reports compliance with her current medications and no side effects  or concerns.  She is due for labs and needs refill of her medications.  Teresa denies chest pain, change in her breathing, edema, headaches, or vision changes.  She is doing routine eye and dental care.  She did see pulmonologist last November and had normal pulmonary function studies they did not feel her breathing complaints were related to her lungs.  In October, she had seen ENT for voice changes/choking complaint.  The did pleat exam including laryngoscopy and sun nothing to explain symptoms other than some aging vocal cords and suggested speech therapy.  She defers.    The following portions of the patient's history were reviewed and updated as appropriate: allergies, current medications, past family history, past medical history, past social history, past surgical history and problem list.    Review of Systems  Pertinent items are noted in HPI.  A 12 point comprehensive review of systems was negative except as noted.    Immunization History   Administered Date(s) Administered     Influenza P0g3-17, 12/28/2009     Influenza high dose, seasonal 09/29/2015, 09/16/2016     Influenza, inj, historic 10/21/2009     Influenza, seasonal,quad inj 6-35 mos 09/28/2011, 10/18/2012, 09/16/2013, 10/20/2014     Pneumo Conj 13-V (2010&after) 01/02/2015, 08/26/2015     Pneumo Polysac 23-V 10/12/2009     Td, historic 08/05/2008     Tdap 08/05/2008     ZOSTER 10/29/2012     No results found for this or any previous visit (from the past 240 hour(s)).    Patient Active Problem List   Diagnosis     Depression With Anxiety     Generalized Osteoarthritis Of The Hand     Bursitis Of The Hip     Watery Discharge From Eyes     Hypothyroidism     Hypercholesterolemia     Contact Dermatitis     Lump In / On The Skin     Osteopenia     Menopause Has Occurred     Choking     Joint Pain In The Right Hip      Tricompartment osteoarthritis of left knee     Joint Pain, Localized In The Shoulder      HTN (hypertension)     Family History  "  Problem Relation Age of Onset     No Medical Problems Mother      Myasthenia gravis Daughter      Breast cancer Sister      Hypertension Sister      Breast cancer Sister      Stroke Sister      Cerebral aneurysm Brother      Cancer Brother      Social History     Social History     Marital status:      Spouse name: N/A     Number of children: N/A     Years of education: N/A     Occupational History     Not on file.     Social History Main Topics     Smoking status: Never Smoker     Smokeless tobacco: Never Used     Alcohol use Not on file     Drug use: Not on file     Sexual activity: Not on file     Other Topics Concern     Not on file     Social History Narrative       Objective:    /68 (Patient Site: Right Arm, Patient Position: Sitting, Cuff Size: Adult Regular)  Pulse 62  Temp 98.3  F (36.8  C) (Oral)   Resp 16  Ht 5' 1.75\" (1.568 m)  Wt 136 lb (61.7 kg)  BMI 25.08 kg/m2    Little interest or pleasure in doing things: Not at all  Feeling down, depressed, or hopeless: Not at all  Trouble falling or staying asleep, or sleeping too much: Several days  Feeling tired or having little energy: Several days  Poor appetite or overeating: Not at all  Feeling bad about yourself - or that you are a failure or have let yourself or your family down: Several days  Trouble concentrating on things, such as reading the newspaper or watching television: Not at all  Moving or speaking so slowly that other people could have noticed. Or the opposite - being so fidgety or restless that you have been moving around a lot more than usual: Several days  Thoughts that you would be better off dead, or of hurting yourself in some way: Not at all  PHQ-9 Total Score: 4  If you checked off any problems, how difficult have these problems made it for you to do your work, take care of things at home, or get along with other people?: Somewhat difficult      General Appearance:    Alert, cooperative, no distress, appears stated " age   Head:    Normocephalic, without obvious abnormality, atraumatic   Eyes:    PERRL, conjunctiva/corneas clear, EOM's intact both eyes   Ears:    Normal TM's and external ear canals, both ears   Nose:   Nares normal, septum midline, mucosa normal   Throat:   Lips, mucosa, and tongue normal; teeth and gums normal   Neck:   Supple, symmetrical, trachea midline, no adenopathy;     thyroid:  no enlargement/tenderness/nodules; no carotid    bruit or JVD   Back:     Symmetric, no curvature, no CVA tenderness   Lungs:     Clear to auscultation bilaterally, respirations unlabored   Chest Wall:    No tenderness or deformity    Heart:    Regular rate and rhythm, S1 and S2 normal, no murmur, rub   or gallop   Breast Exam:    No tenderness, masses, or nipple abnormality   Abdomen:     Soft, non-tender, bowel sounds active all four quadrants,     no masses, no organomegaly   Genitalia:   Atrophic changes noted.  Normal female genitalia.  No lesions or discharge   Rectal:   External rectal tissue.   Extremities:   Extremities normal, atraumatic, no cyanosis or edema; painful over metatarsal head with pressure; bunions bilaterally R >L w/ hammer toe   Pulses:   2+ and symmetric all extremities   Skin:   Skin color, texture, turgor normal, no rashes or lesions---several Eb K       Neurologic:   CNII-XII intact

## 2021-06-19 NOTE — LETTER
Letter by Usha Johnson MD at      Author: Usha Johnson MD Service: -- Author Type: --    Filed:  Encounter Date: 4/16/2019 Status: (Other)       Polina Fernandez  [address] [place] [address]                 April 16, 2019      Dear Polina:     At your previous appointment with us your blood pressure was elevated and I would like for you to schedule a nurse only appointment to recheck your blood pressure.    We have included a personalized hypertension report card on the following page that lists your most recent blood pressure readings along with your ideal values. Please message us through [facility] or call us at [phone] to schedule your nurse only appointment.    Thank you for including us as members of your health care team.      Sincerely,         Usha Johnson MD    Enclosure    Hypertension Report Card for oPlina Fernandez  April 16, 2019:     Below is a summary of recent tests related to your hypertension.     Blood Pressure:   Normal blood pressure values are 120/80 or lower. Your blood pressure values should be less than 120/80.     Your most recent blood pressure readings at our clinic were:   BP Readings from Last 3 Encounters:   11/26/18 150/83   10/09/17 120/62   09/18/17 142/64

## 2021-06-21 NOTE — PROGRESS NOTES
Assessment and Plan:       1. Encounter for Medicare annual wellness exam  Immunizations reviewed ---had seasonal flu shot; shingrix #1 today  Colonosocopy n/a based on age/risk factors  Mammography reviewed 11/18---+FH and pt elects to continue screening (has appt Jan '19  Discussed importance of regular exercise and appropriate calcium intake  Discussed Advance Directives--on file; updated per pt    2. Depression With Anxiety  Stable w/ sertraline (PHQ9=2, GAD7=3) and pt desires no change    3. Hypercholesterolemia  Recheck fasting labs today---continue lovastatin though discussed w/o CAD/ DM could d/c statin given age; pt elects to continue for now  - Lipid Cascade FASTING    4. Hypothyroidism, unspecified type  Recheck labs; continue levothyroxine  - Thyroid Cascade    5. Closed fracture of right wrist with routine healing, subsequent encounter  Fracture 11/17; no ongoing issues; overdue for DEXA  - DXA Bone Density Scan; Future    6. Osteopenia  As above; Recommend calcium intake (12-1500mg/day) with vitamin D (800-1000 IU daily) as well as weight bearing exercise to include strength and balance.  Calcium supplement should be calcium CITRATE  Given fracture, consider Rx but pt wants to do DEXA first    7. Benign essential hypertension  Elevated blood pressure noted today--patient will do follow-up recheck in 1-2 weeks.  Continue with metoprolol and update labs  - Comprehensive Metabolic Panel    8. Physical deconditioning  - Ambulatory referral to Physical Therapy    9. Personal history of fall  See above w/ fracture hx---start PT to work on strength/ balance  - Ambulatory referral to Physical Therapy    10. Hip pain, left  Unclear etiology---will do films today and start PT; okay to continue with topical Aspercreme if helpful.  - Ambulatory referral to Physical Therapy    11. Chronic rhinitis  Trial of nasal steroid recommended    12. Seborrheic dermatitis  Trial Rx lotrisone -- use two times a day to  scalp/eyebrows and follow up if ongoing issues; pt education provided      The patient's current medical problems were reviewed.    I have had an Advance Directives discussion with the patient.  The following high BMI interventions were performed this visit: encouragement to exercise and weight monitoring  The following health maintenance schedule was reviewed with the patient and provided in printed form in the after visit summary:   Health Maintenance   Topic Date Due     DXA SCAN  02/17/2000     ZOSTER VACCINES (2 of 3) 12/24/2012     DEPRESSION FOLLOW UP  03/18/2018     INFLUENZA VACCINE RULE BASED (1) 08/01/2018     TD 18+ HE  08/05/2018     FALL RISK ASSESSMENT  09/18/2018     ADVANCE DIRECTIVES DISCUSSED WITH PATIENT  09/18/2022     PNEUMOCOCCAL POLYSACCHARIDE VACCINE AGE 65 AND OVER  Completed     PNEUMOCOCCAL CONJUGATE VACCINE FOR ADULTS (PCV13 OR PREVNAR)  Completed        Subjective:   Chief Complaint: Polina Fernandez is an 83 y.o. female here for an Annual Wellness visit.   HPI: Polina is an 83-year-old female accompanied by her daughter for a physical exam today.  Past medical history significant for hypertension, hypothyroidism, and hypercholesterolemia.  Patient reports no concerns with current medications or need for refills.  She is due for routine labs.  Patient has history of fall with wrist fracture a year ago.  She has not followed through on bone density study.  She is taking supplemental calcium and vitamin D.  Last DEXA showed osteopenia.  No further history of falls.  Patient is doing no regular exercise  Patient reports no issues with mood and is happy with current dose of sertraline--she desires no changes.  Hypertension well controlled with metoprolol at current dose.  Patient denies chest pain, shortness of breath, lower extremity edema, headaches, or vision changes.  Blood pressure elevated today and patient reports she did take her medication at usual time--usually takes 4 PM  daily.  Patient complaining of bilateral knee pain and some instability.  She has no interest in pursuing orthopedic consultation--no real interest in surgery.  She would be willing to consider physical therapy.  She has been having some left buttock pain over the last few months.  She states it is better when she is seated and worse when she is walking for prolonged period is isolated to the buttock area without radiation into her leg or coming from her back.  No lower extremity numbness, tingling, or weakness.  Patient states she will take an aspirin at bedtime and use topical Aspercreme with some relief.  She has tried no other analgesics.  She is not trying ice or heat.  She does continue with her low back exercises routinely and this is not limited by her current complaint.  She denies any history of injury or trauma.  Polina also complains today about a rash.  Significant redness and flaking just inside her scalp near the temple areas bilaterally as well as both eyebrows.  She states she tries to moisturize this with minimal improvement.  No history of any new exposures to her skin.  Patient complains of persistent runny nose and holds Kleenex here throughout the visit.  She states she occasionally has a hoarse voice and slight cough but no sore throat or issues.  She is not aware of particular allergies but has not trialed any allergy medication, decongestant or nasal steroid.  Symptoms do not appear to be chronic and not related to any acute upper respiratory illness.    Review of Systems:   Please see above.  The rest of the review of systems are negative for all systems.    Patient Care Team:  Usha Johnson MD as PCP - General     Patient Active Problem List   Diagnosis     Depression With Anxiety     Generalized Osteoarthritis Of The Hand     Bursitis Of The Hip     Watery Discharge From Eyes     Hypothyroidism     Hypercholesterolemia     Contact Dermatitis     Lump In / On The Skin     Osteopenia      Menopause Has Occurred     Choking     Joint Pain In The Right Hip      Tricompartment osteoarthritis of left knee     Joint Pain, Localized In The Shoulder      HTN (hypertension)     Past Medical History:   Diagnosis Date     Breast cyst      Disease of thyroid gland      Hyperlipidemia      Hypertension       Past Surgical History:   Procedure Laterality Date     BREAST CYST ASPIRATION       NJ APPENDECTOMY      Description: Appendectomy;  Recorded: 01/28/2008;     NJ CONIZATION CERVIX,LOOP ELECTRD      Description: Cervical Conization Loop Electrode Excision;  Proc Date: 02/01/2005;     NJ REMOVAL OF TONSILS,<11 Y/O      Description: Tonsillectomy;  Recorded: 01/28/2008;     N[place] REVISE MEDIAN N/CARPAL TUNNEL SURG      Description: Neuroplasty Decompression Median Nerve At Carpal Tunnel;  Recorded: 11/02/2012;      Family History   Problem Relation Age of Onset     No Medical Problems Mother      Myasthenia gravis Daughter      Breast cancer Sister      Hypertension Sister      Breast cancer Sister      Stroke Sister      Cerebral aneurysm Brother      Cancer Brother       Social History     Socioeconomic History     Marital status:      Spouse name: Not on file     Number of children: Not on file     Years of education: Not on file     Highest education level: Not on file   Social Needs     Financial resource strain: Not on file     Food insecurity - worry: Not on file     Food insecurity - inability: Not on file     Transportation needs - medical: Not on file     Transportation needs - non-medical: Not on file   Occupational History     Not on file   Tobacco Use     Smoking status: Never Smoker     Smokeless tobacco: Never Used   Substance and Sexual Activity     Alcohol use: Not on file     Drug use: Not on file     Sexual activity: Not on file   Other Topics Concern     Not on file   Social History Narrative     Not on file      Current Outpatient Medications   Medication Sig Dispense Refill      "ASCORBIC ACID (CHEWABLE VITAMIN C ORAL) Chew and swallow 1 tablet daily.       aspirin 325 MG tablet Take 325 mg by mouth daily.       CALCIUM-MAGNESIUM-VITAMIN D2 ORAL Take 2 capsules by mouth daily.       FOLIC ACID ORAL Take 1 capsule by mouth daily.       GLUCOSAMINE/CHONDROITIN SULF A (GLUCOSAMINE-CHONDROITIN ORAL) Take 2 tablets by mouth daily.        levothyroxine (SYNTHROID, LEVOTHROID) 88 MCG tablet TAKE ONE TABLET BY MOUTH ONE TIME DAILY 90 tablet 0     lovastatin (MEVACOR) 20 MG tablet Take 1 tablet (20 mg total) by mouth at bedtime. 90 tablet 0     lysine 500 mg cap Take 1 capsule by mouth daily.       metoprolol succinate (TOPROL-XL) 100 MG 24 hr tablet Take 1 tablet (100 mg total) by mouth daily. 90 tablet 0     MULTIVITAMIN ORAL Take 1 tablet by mouth daily.       omega-3 fatty acids-vitamin E (FISH OIL) 1,000 mg cap Take 1 capsule by mouth daily.       selenium 100 mcg Tab Take 1 tablet by mouth daily. As directed.       sertraline (ZOLOFT) 100 MG tablet Take 1.5 tablets (150 mg total) by mouth daily. Take one and one-half tablets daily. 135 tablet 0     UBIDECARENONE (CO Q-10 ORAL) Take 1 capsule by mouth daily.       No current facility-administered medications for this visit.       Objective:   Vital Signs:   Visit Vitals  /78 (Patient Site: Left Arm, Patient Position: Sitting, Cuff Size: Adult Regular)   Pulse 65   Temp (!) 96.5  F (35.8  C) (Oral)   Ht 5' 1.5\" (1.562 m)   Wt 143 lb (64.9 kg)   SpO2 94%   BMI 26.58 kg/m         VisionScreening:  No exam data present     PHYSICAL EXAM      General Appearance:    Alert, cooperative, no distress, appears stated age   Head:    Normocephalic, without obvious abnormality, atraumatic   Eyes:    PERRL, conjunctiva/corneas clear, EOM's intact both eyes   Ears:    Normal TM's and external ear canals, both ears   Nose:   Nares normal, septum midline, mucosa normal   Throat:   Lips, mucosa, and tongue normal; teeth and gums normal   Neck:   Supple, " symmetrical, trachea midline, no adenopathy;     thyroid:  no enlargement/tenderness/nodules; no carotid    bruit or JVD   Back:     Symmetric, no curvature, no CVA tenderness   Lungs:     Clear to auscultation bilaterally, respirations unlabored   Chest Wall:    No tenderness or deformity    Heart:    Regular rate and rhythm, S1 and S2 normal, no murmur, rub   or gallop   Breast Exam:    No tenderness, masses, or nipple abnormality   Abdomen:     Soft, non-tender, bowel sounds active all four quadrants,     no masses, no organomegaly   Genitalia:   deferred   Rectal:    deferred   Extremities:   Extremities normal, atraumatic, no cyanosis.  Trace edema bilaterally  Back: Lumbar spine tenderness on direct palpation.  No pain over the SI joints.  Pain deep left buttock area.  Maintains good range of motion of left hip and good lower extremity strength bilaterally.   Pulses:   2+ and symmetric all extremities   Skin:   Skin color, texture, turgor normal; several seb K over trunk, larger one L temple area  Oily flaking on red base in scalp at temple area and eyebrows.   Neurologic:   CNII-XII intact             Assessment Results 11/26/2018   Activities of Daily Living No help needed   Instrumental Activities of Daily Living No help needed   Mini Cog Total Score 5   Some recent data might be hidden     A Mini-Cog score of 0-2 suggests the possibility of dementia, score of 3-5 suggests no dementia    Identified Health Risks:     She is at risk for lack of exercise and has been provided with information to increase physical activity for the benefit of her well-being.  The patient was provided with written information regarding signs of hearing loss.  Information on urinary incontinence and treatment options given to patient.  She is at risk for falling and has been provided with information to reduce the risk of falling at home.  Patient's advanced directive was discussed and I am comfortable with the patient's  wishes.

## 2021-06-23 NOTE — TELEPHONE ENCOUNTER
Refill Approved    Rx renewed per Medication Renewal Policy. Medication was last renewed on 10/20/18.    Layla Gómez, [facility] Triage/Med Refill 1/24/2019     Requested Prescriptions   Pending Prescriptions Disp Refills     sertraline (ZOLOFT) 100 MG tablet 135 tablet 0     Sig: Take 1.5 tablets (150 mg total) by mouth daily. Take one and one-half tablets daily.    SSRI Refill Protocol  Passed - 1/23/2019 12:29 PM       Passed - PCP or prescribing provider visit in last year    Last office visit with prescriber/PCP: 1/2/2015 Usha Johnson MD OR same dept: Visit date not found OR same specialty: 10/18/2016 Cheryl Burnett, CNP  Last physical: 11/26/2018 Last MTM visit: Visit date not found   Next visit within 3 mo: Visit date not found  Next physical within 3 mo: Visit date not found  Prescriber OR PCP: Usha Johnson MD  Last diagnosis associated with med order: 1. Depression With Anxiety  - sertraline (ZOLOFT) 100 MG tablet; Take 1.5 tablets (150 mg total) by mouth daily. Take one and one-half tablets daily.  Dispense: 135 tablet; Refill: 0    If protocol passes may refill for 12 months if within 3 months of last provider visit (or a total of 15 months).

## 2021-06-24 NOTE — TELEPHONE ENCOUNTER
Refill Approved    Rx renewed per Medication Renewal Policy. Medication was last renewed on 11/16/18.    Layla Gómez, [facility] Triage/Med Refill 3/1/2019     Requested Prescriptions   Pending Prescriptions Disp Refills     metoprolol succinate (TOPROL-XL) 100 MG 24 hr tablet 90 tablet 0     Sig: Take 1 tablet (100 mg total) by mouth daily.    Beta-Blockers Refill Protocol Passed - 3/1/2019 10:53 AM       Passed - PCP or prescribing provider visit in past 12 months or next 3 months    Last office visit with prescriber/PCP: Visit date not found OR same dept: Visit date not found OR same specialty: 10/18/2016 Cheryl Burnett, CNP  Last physical: 11/26/2018 Last MTM visit: Visit date not found   Next visit within 3 mo: Visit date not found  Next physical within 3 mo: Visit date not found  Prescriber OR PCP: Usha Johnson MD  Last diagnosis associated with med order: 1. Essential hypertension  - metoprolol succinate (TOPROL-XL) 100 MG 24 hr tablet; Take 1 tablet (100 mg total) by mouth daily.  Dispense: 90 tablet; Refill: 0    If protocol passes may refill for 12 months if within 3 months of last provider visit (or a total of 15 months).            Passed - Blood pressure filed in past 12 months    BP Readings from Last 1 Encounters:   11/26/18 150/83

## 2021-06-24 NOTE — TELEPHONE ENCOUNTER
Refill Approved    Rx renewed per Medication Renewal Policy. Medication was last renewed on 11/3/18.    Ov: 11/26/18    Mariah Watson, [facility] Triage/Med Refill 2/15/2019     Requested Prescriptions   Pending Prescriptions Disp Refills     levothyroxine (SYNTHROID, LEVOTHROID) 88 MCG tablet 90 tablet 0     Sig: TAKE ONE TABLET BY MOUTH ONE TIME DAILY    Thyroid Hormones Protocol Passed - 2/15/2019  1:08 PM       Passed - Provider visit in past 12 months or next 3 months    Last office visit with prescriber/PCP: Visit date not found OR same dept: Visit date not found OR same specialty: 10/18/2016 Cheryl Burnett CNP  Last physical: 11/26/2018 Last MTM visit: Visit date not found   Next visit within 3 mo: Visit date not found  Next physical within 3 mo: Visit date not found  Prescriber OR PCP: Usha Johnson MD  Last diagnosis associated with med order: 1. Hypercholesteremia  - lovastatin (MEVACOR) 20 MG tablet; Take 1 tablet (20 mg total) by mouth at bedtime.  Dispense: 90 tablet; Refill: 0    If protocol passes may refill for 12 months if within 3 months of last provider visit (or a total of 15 months).            Passed - TSH on file in past 12 months for patient age 12 & older    TSH   Date Value Ref Range Status   11/26/2018 3.31 0.30 - 5.00 uIU/mL Final                   lovastatin (MEVACOR) 20 MG tablet 90 tablet 0     Sig: Take 1 tablet (20 mg total) by mouth at bedtime.    Statins Refill Protocol (Hmg CoA Reductase Inhibitors) Passed - 2/15/2019  1:08 PM       Passed - PCP or prescribing provider visit in past 12 months     Last office visit with prescriber/PCP: Visit date not found OR same dept: Visit date not found OR same specialty: 10/18/2016 Cheryl Burnett CNP  Last physical: 11/26/2018 Last MTM visit: Visit date not found   Next visit within 3 mo: Visit date not found  Next physical within 3 mo: Visit date not found  Prescriber OR PCP: Usha Johnson MD  Last diagnosis  associated with med order: 1. Hypercholesteremia  - lovastatin (MEVACOR) 20 MG tablet; Take 1 tablet (20 mg total) by mouth at bedtime.  Dispense: 90 tablet; Refill: 0    If protocol passes may refill for 12 months if within 3 months of last provider visit (or a total of 15 months).

## 2021-06-24 NOTE — TELEPHONE ENCOUNTER
Refill Request  Did you contact pharmacy: Yes  Medication name:   Requested Prescriptions     Pending Prescriptions Disp Refills     levothyroxine (SYNTHROID, LEVOTHROID) 88 MCG tablet 90 tablet 0     Sig: TAKE ONE TABLET BY MOUTH ONE TIME DAILY     lovastatin (MEVACOR) 20 MG tablet 90 tablet 0     Sig: Take 1 tablet (20 mg total) by mouth at bedtime.     Who prescribed the medication: Dr Johnson  Pharmacy Name and Location: [facility]    Is patient out of medication: Yes , states needs these two medication sent to pharmacy today.        Patient notified refills processed in 72 hours:  yes  Okay to leave a detailed message: yes

## 2021-06-25 NOTE — TELEPHONE ENCOUNTER
Left message to call back for: pt due for asppointment  Information to relay to patient:  See below and please assist pt in scheduling if they call back.        Note from 11/26/18 OV     7. Benign essential hypertension  Elevated blood pressure noted today--patient will do follow-up recheck in 1-2 weeks.  Continue with metoprolol and update labs  - Comprehensive Metabolic Panel

## 2021-07-10 ENCOUNTER — HEALTH MAINTENANCE LETTER (OUTPATIENT)
Age: 86
End: 2021-07-10

## 2021-07-13 ENCOUNTER — RECORDS - HEALTHEAST (OUTPATIENT)
Dept: ADMINISTRATIVE | Facility: CLINIC | Age: 86
End: 2021-07-13

## 2021-07-21 ENCOUNTER — RECORDS - HEALTHEAST (OUTPATIENT)
Dept: ADMINISTRATIVE | Facility: CLINIC | Age: 86
End: 2021-07-21

## 2021-09-04 ENCOUNTER — HEALTH MAINTENANCE LETTER (OUTPATIENT)
Age: 86
End: 2021-09-04

## 2022-06-15 ENCOUNTER — TRANSFERRED RECORDS (OUTPATIENT)
Dept: HEALTH INFORMATION MANAGEMENT | Facility: CLINIC | Age: 87
End: 2022-06-15

## 2022-08-06 ENCOUNTER — HEALTH MAINTENANCE LETTER (OUTPATIENT)
Age: 87
End: 2022-08-06

## 2022-10-22 ENCOUNTER — HEALTH MAINTENANCE LETTER (OUTPATIENT)
Age: 87
End: 2022-10-22

## 2023-07-25 ENCOUNTER — OFFICE VISIT (OUTPATIENT)
Dept: FAMILY MEDICINE | Facility: CLINIC | Age: 88
End: 2023-07-25
Payer: COMMERCIAL

## 2023-07-25 VITALS
WEIGHT: 129 LBS | DIASTOLIC BLOOD PRESSURE: 72 MMHG | OXYGEN SATURATION: 95 % | HEART RATE: 66 BPM | RESPIRATION RATE: 16 BRPM | SYSTOLIC BLOOD PRESSURE: 156 MMHG | HEIGHT: 62 IN | BODY MASS INDEX: 23.74 KG/M2

## 2023-07-25 DIAGNOSIS — I77.810 ASCENDING AORTA DILATION (H): ICD-10-CM

## 2023-07-25 DIAGNOSIS — E78.5 HYPERLIPIDEMIA LDL GOAL <100: ICD-10-CM

## 2023-07-25 DIAGNOSIS — Z00.00 ENCOUNTER FOR MEDICARE ANNUAL WELLNESS EXAM: Primary | ICD-10-CM

## 2023-07-25 DIAGNOSIS — E03.9 HYPOTHYROIDISM, UNSPECIFIED TYPE: ICD-10-CM

## 2023-07-25 DIAGNOSIS — F33.41 RECURRENT MAJOR DEPRESSIVE DISORDER, IN PARTIAL REMISSION (H): ICD-10-CM

## 2023-07-25 DIAGNOSIS — Z95.0 S/P PLACEMENT OF CARDIAC PACEMAKER: ICD-10-CM

## 2023-07-25 DIAGNOSIS — I10 BENIGN ESSENTIAL HYPERTENSION: ICD-10-CM

## 2023-07-25 DIAGNOSIS — J34.89 RHINORRHEA: ICD-10-CM

## 2023-07-25 DIAGNOSIS — N18.2 CKD (CHRONIC KIDNEY DISEASE) STAGE 2, GFR 60-89 ML/MIN: ICD-10-CM

## 2023-07-25 DIAGNOSIS — R05.3 CHRONIC COUGH: ICD-10-CM

## 2023-07-25 PROBLEM — M15.9 GENERALIZED OSTEOARTHRITIS OF HAND: Status: ACTIVE | Noted: 2019-07-09

## 2023-07-25 PROBLEM — F33.2 MAJOR DEPRESSIVE DISORDER, RECURRENT SEVERE WITHOUT PSYCHOTIC FEATURES (H): Status: ACTIVE | Noted: 2022-03-22

## 2023-07-25 PROBLEM — M85.80 OSTEOPENIA WITH HIGH RISK OF FRACTURE: Status: ACTIVE | Noted: 2019-07-09

## 2023-07-25 LAB
ANION GAP SERPL CALCULATED.3IONS-SCNC: 9 MMOL/L (ref 7–15)
BUN SERPL-MCNC: 11.8 MG/DL (ref 8–23)
CALCIUM SERPL-MCNC: 9.7 MG/DL (ref 8.8–10.2)
CHLORIDE SERPL-SCNC: 98 MMOL/L (ref 98–107)
CHOLEST SERPL-MCNC: 207 MG/DL
CREAT SERPL-MCNC: 0.72 MG/DL (ref 0.51–0.95)
DEPRECATED HCO3 PLAS-SCNC: 28 MMOL/L (ref 22–29)
GFR SERPL CREATININE-BSD FRML MDRD: 80 ML/MIN/1.73M2
GLUCOSE SERPL-MCNC: 102 MG/DL (ref 70–99)
HDLC SERPL-MCNC: 70 MG/DL
LDLC SERPL CALC-MCNC: 118 MG/DL
NONHDLC SERPL-MCNC: 137 MG/DL
POTASSIUM SERPL-SCNC: 4.6 MMOL/L (ref 3.4–5.3)
SODIUM SERPL-SCNC: 135 MMOL/L (ref 136–145)
TRIGL SERPL-MCNC: 96 MG/DL
TSH SERPL DL<=0.005 MIU/L-ACNC: 4.21 UIU/ML (ref 0.3–4.2)

## 2023-07-25 PROCEDURE — 99204 OFFICE O/P NEW MOD 45 MIN: CPT | Mod: 25 | Performed by: STUDENT IN AN ORGANIZED HEALTH CARE EDUCATION/TRAINING PROGRAM

## 2023-07-25 PROCEDURE — 80048 BASIC METABOLIC PNL TOTAL CA: CPT | Performed by: STUDENT IN AN ORGANIZED HEALTH CARE EDUCATION/TRAINING PROGRAM

## 2023-07-25 PROCEDURE — G0438 PPPS, INITIAL VISIT: HCPCS | Performed by: STUDENT IN AN ORGANIZED HEALTH CARE EDUCATION/TRAINING PROGRAM

## 2023-07-25 PROCEDURE — 80061 LIPID PANEL: CPT | Performed by: STUDENT IN AN ORGANIZED HEALTH CARE EDUCATION/TRAINING PROGRAM

## 2023-07-25 PROCEDURE — 36415 COLL VENOUS BLD VENIPUNCTURE: CPT | Performed by: STUDENT IN AN ORGANIZED HEALTH CARE EDUCATION/TRAINING PROGRAM

## 2023-07-25 PROCEDURE — 84443 ASSAY THYROID STIM HORMONE: CPT | Performed by: STUDENT IN AN ORGANIZED HEALTH CARE EDUCATION/TRAINING PROGRAM

## 2023-07-25 RX ORDER — FLUTICASONE PROPIONATE 50 MCG
1 SPRAY, SUSPENSION (ML) NASAL AT BEDTIME
Qty: 15.8 ML | Refills: 1 | Status: SHIPPED | OUTPATIENT
Start: 2023-07-25 | End: 2024-05-28

## 2023-07-25 RX ORDER — HYDROCODONE/ACETAMINOPHEN 5 MG-500MG
TABLET ORAL
COMMUNITY

## 2023-07-25 RX ORDER — CALCIUM CITRATE 1040MG
500 TABLET ORAL
COMMUNITY

## 2023-07-25 RX ORDER — LOVASTATIN 20 MG
20 TABLET ORAL AT BEDTIME
Qty: 90 TABLET | Refills: 3 | Status: SHIPPED | OUTPATIENT
Start: 2023-07-25 | End: 2024-07-03

## 2023-07-25 RX ORDER — SERTRALINE HYDROCHLORIDE 100 MG/1
100 TABLET, FILM COATED ORAL DAILY
Qty: 90 TABLET | Refills: 3 | Status: SHIPPED | OUTPATIENT
Start: 2023-07-25 | End: 2024-07-03

## 2023-07-25 RX ORDER — LEVOTHYROXINE SODIUM 88 UG/1
88 TABLET ORAL DAILY
Status: CANCELLED | OUTPATIENT
Start: 2023-07-25

## 2023-07-25 RX ORDER — METOPROLOL TARTRATE 25 MG/1
25 TABLET, FILM COATED ORAL 2 TIMES DAILY
Qty: 180 TABLET | Refills: 3 | Status: SHIPPED | OUTPATIENT
Start: 2023-07-25 | End: 2024-07-03

## 2023-07-25 RX ORDER — TACROLIMUS 1 MG/G
OINTMENT TOPICAL
COMMUNITY
Start: 2023-04-26 | End: 2023-07-25

## 2023-07-25 RX ORDER — LYSINE HCL 500 MG
100 TABLET ORAL DAILY
COMMUNITY

## 2023-07-25 RX ORDER — METOPROLOL TARTRATE 25 MG/1
TABLET, FILM COATED ORAL
COMMUNITY
Start: 2023-05-01 | End: 2023-07-25

## 2023-07-25 RX ORDER — BIOTIN 5 MG
TABLET ORAL
COMMUNITY

## 2023-07-25 RX ORDER — MOMETASONE FUROATE 1 MG/ML
SOLUTION TOPICAL
COMMUNITY
Start: 2023-04-26 | End: 2023-07-25

## 2023-07-25 RX ORDER — OMEGA-3 FATTY ACIDS/FISH OIL 300-1000MG
1 CAPSULE ORAL DAILY
COMMUNITY
End: 2023-07-25

## 2023-07-25 RX ORDER — ZINC GLUCONATE 50 MG
50 TABLET ORAL DAILY
COMMUNITY

## 2023-07-25 ASSESSMENT — ANXIETY QUESTIONNAIRES
3. WORRYING TOO MUCH ABOUT DIFFERENT THINGS: NOT AT ALL
5. BEING SO RESTLESS THAT IT IS HARD TO SIT STILL: NOT AT ALL
1. FEELING NERVOUS, ANXIOUS, OR ON EDGE: SEVERAL DAYS
7. FEELING AFRAID AS IF SOMETHING AWFUL MIGHT HAPPEN: NOT AT ALL
GAD7 TOTAL SCORE: 1
4. TROUBLE RELAXING: NOT AT ALL
2. NOT BEING ABLE TO STOP OR CONTROL WORRYING: NOT AT ALL
IF YOU CHECKED OFF ANY PROBLEMS ON THIS QUESTIONNAIRE, HOW DIFFICULT HAVE THESE PROBLEMS MADE IT FOR YOU TO DO YOUR WORK, TAKE CARE OF THINGS AT HOME, OR GET ALONG WITH OTHER PEOPLE: NOT DIFFICULT AT ALL
GAD7 TOTAL SCORE: 1
6. BECOMING EASILY ANNOYED OR IRRITABLE: NOT AT ALL

## 2023-07-25 ASSESSMENT — ENCOUNTER SYMPTOMS
COUGH: 1
FREQUENCY: 0
JOINT SWELLING: 0
NERVOUS/ANXIOUS: 1
SORE THROAT: 0
EYE PAIN: 0
HEADACHES: 0
HEMATURIA: 0
ABDOMINAL PAIN: 0
DIARRHEA: 0
PARESTHESIAS: 0
DIZZINESS: 1
DYSURIA: 0
WEAKNESS: 0
PALPITATIONS: 0
SHORTNESS OF BREATH: 0
CHILLS: 0
HEARTBURN: 0
FEVER: 0
CONSTIPATION: 0
HEMATOCHEZIA: 0
MYALGIAS: 0
ARTHRALGIAS: 1
NAUSEA: 0
BREAST MASS: 0

## 2023-07-25 ASSESSMENT — PATIENT HEALTH QUESTIONNAIRE - PHQ9
10. IF YOU CHECKED OFF ANY PROBLEMS, HOW DIFFICULT HAVE THESE PROBLEMS MADE IT FOR YOU TO DO YOUR WORK, TAKE CARE OF THINGS AT HOME, OR GET ALONG WITH OTHER PEOPLE: NOT DIFFICULT AT ALL
SUM OF ALL RESPONSES TO PHQ QUESTIONS 1-9: 5
SUM OF ALL RESPONSES TO PHQ QUESTIONS 1-9: 5

## 2023-07-25 ASSESSMENT — ACTIVITIES OF DAILY LIVING (ADL): CURRENT_FUNCTION: NO ASSISTANCE NEEDED

## 2023-07-25 ASSESSMENT — PAIN SCALES - GENERAL: PAINLEVEL: NO PAIN (0)

## 2023-07-25 NOTE — LETTER
July 28, 2023      Polina Fernandez  [address] [address]        Dear ,    We are writing to inform you of your test results.      Her TSH is slightly elevated, but still within a reasonable variation, particularly given her age, so for now we can continue at her current dose, but I do want to recheck the TSH in 6 months.     The cholesterol is slightly higher this year then previously, but I don't think we need to adjust her cholesterol medication at this time. Recheck in 1 year.     The kidney function and electrolytes all looked quite good     Overall, no changes to medication, just plan a lab recheck in 6 months.      Resulted Orders   TSH   Result Value Ref Range    TSH 4.21 (H) 0.30 - 4.20 uIU/mL   Lipid panel reflex to direct LDL Fasting   Result Value Ref Range    Cholesterol 207 (H) <200 mg/dL    Triglycerides 96 <150 mg/dL    Direct Measure HDL 70 >=50 mg/dL    LDL Cholesterol Calculated 118 (H) <=100 mg/dL    Non HDL Cholesterol 137 (H) <130 mg/dL    Narrative    Cholesterol  Desirable:  <200 mg/dL    Triglycerides  Normal:  Less than 150 mg/dL  Borderline High:  150-199 mg/dL  High:  200-499 mg/dL  Very High:  Greater than or equal to 500 mg/dL    Direct Measure HDL  Female:  Greater than or equal to 50 mg/dL   Male:  Greater than or equal to 40 mg/dL    LDL Cholesterol  Desirable:  <100mg/dL  Above Desirable:  100-129 mg/dL   Borderline High:  130-159 mg/dL   High:  160-189 mg/dL   Very High:  >= 190 mg/dL    Non HDL Cholesterol  Desirable:  130 mg/dL  Above Desirable:  130-159 mg/dL  Borderline High:  160-189 mg/dL  High:  190-219 mg/dL  Very High:  Greater than or equal to 220 mg/dL   Basic metabolic panel   Result Value Ref Range    Sodium 135 (L) 136 - 145 mmol/L    Potassium 4.6 3.4 - 5.3 mmol/L    Chloride 98 98 - 107 mmol/L    Carbon Dioxide (CO2) 28 22 - 29 mmol/L    Anion Gap 9 7 - 15 mmol/L    Urea Nitrogen 11.8 8.0 - 23.0 mg/dL    Creatinine 0.72 0.51 - 0.95  mg/dL    Calcium 9.7 8.8 - 10.2 mg/dL    Glucose 102 (H) 70 - 99 mg/dL    GFR Estimate 80 >60 mL/min/1.73m2       If you have any questions or concerns, please call the clinic at the number listed above.       Sincerely,      Sharda Soliman MD

## 2023-07-25 NOTE — PROGRESS NOTES
"SUBJECTIVE:   Polina is a 88 year old who presents for Preventive Visit.  Chief Complaint   Patient presents with    Wellness Visit         7/25/2023     1:19 PM   Additional Questions   Roomed by Anna ZHOU   Accompanied by Self     Are you in the first 12 months of your Medicare coverage?  No    Healthy Habits:     In general, how would you rate your overall health?  Fair    Frequency of exercise:  None    Do you usually eat at least 4 servings of fruit and vegetables a day, include whole grains    & fiber and avoid regularly eating high fat or \"junk\" foods?  Yes    Taking medications regularly:  Yes    Ability to successfully perform activities of daily living:  No assistance needed    Home Safety:  No safety concerns identified    Hearing Impairment:  Difficulty following a conversation in a noisy restaurant or crowded room, feel that people are mumbling or not speaking clearly, need to ask people to speak up or repeat themselves, difficulty understanding soft or whispered speech and difficulty understanding speech on the telephone    In the past 6 months, have you been bothered by leaking of urine? Yes    In general, how would you rate your overall mental or emotional health?  Good    Additional concerns today:  Yes    Coughing a lot. Been there a long time.   Phlegm in throat just by drinking water. Also long time.  Same as before.    Sometimes gasping for breath, maybe once a month. For a few seconds.   No chest pain or sweating during those episodes.   Not associated with activities.     Rhinorrhea. A lot of the time.   Doesn't take anything for that.   No heartburn issues.     Pain over the area of the pacemaker.   Doesn't think her pacemaker has triggere  Recheck 150/76    Reviewed echo results from 6/27/2022  Final Impressions:    1. Normal LV size, normal wall thickness, normal global systolic function with an estimated EF of 60 - 65%.    2. Right ventricular cavity size is normal, global systolic RV " function is normal.    3. Moderately enlarged left atrium.    4. The mitral valve is normal, mild mitral regurgitation.    5. The ascending aorta is dilated with a maximal diameter of 4.0 cm.         7/27/2018     2:40 PM 7/25/2023     1:19 PM   PHQ   PHQ-9 Total Score 2 5   Q9: Thoughts of better off dead/self-harm past 2 weeks Not at all Not at all         7/27/2018     2:40 PM 7/25/2023     1:21 PM   KEATON-7 SCORE   Total Score  1 (minimal anxiety)   Total Score 4 1         Have you ever done Advance Care Planning? (For example, a Health Directive, POLST, or a discussion with a medical provider or your loved ones about your wishes): No, advance care planning information given to patient to review.  Patient plans to discuss their wishes with loved ones or provider.         Fall risk  Fallen 2 or more times in the past year?: No  Any fall with injury in the past year?: No    Cognitive Screening   1) Repeat 3 items (Leader, Season, Table)    2) Clock draw: NORMAL  3) 3 item recall: Recalls 2 objects   Results: NORMAL clock, 1-2 items recalled: COGNITIVE IMPAIRMENT LESS LIKELY    Mini-CogTM Copyright S Flor. Licensed by the author for use in [facility]; reprinted with permission (soradha@.[email or web address]). All rights reserved.        Reviewed and updated as needed this visit by clinical staff   Tobacco  Allergies  Meds              Reviewed and updated as needed this visit by Provider                 Social History     Tobacco Use    Smoking status: Never    Smokeless tobacco: Never   Substance Use Topics    Alcohol use: Not on file         7/25/2023     1:17 PM   Alcohol Use   Prescreen: >3 drinks/day or >7 drinks/week? No     Do you have a current opioid prescription? No  Do you use any other controlled substances or medications that are not prescribed by a provider? None    Current providers sharing in care for this patient include:   Patient Care Team:  No Ref-Primary, Physician as PCP - General    The  following health maintenance items are reviewed in Epic and correct as of today:  Health Maintenance   Topic Date Due    ANNUAL REVIEW OF HM ORDERS  Never done    ZOSTER IMMUNIZATION (3 of 3) 01/21/2019    TSH W/FREE T4 REFLEX  11/26/2019    COVID-19 Vaccine (5 - Moderna series) 02/28/2023    MEDICARE ANNUAL WELLNESS VISIT  06/08/2023    INFLUENZA VACCINE (1) 09/01/2023    ADVANCE CARE PLANNING  04/23/2024    FALL RISK ASSESSMENT  07/25/2024    DTAP/TDAP/TD IMMUNIZATION (3 - Td or Tdap) 12/18/2025    PHQ-2 (once per calendar year)  Completed    Pneumococcal Vaccine: 65+ Years  Completed    IPV IMMUNIZATION  Aged Out    MENINGITIS IMMUNIZATION  Aged Out     BP Readings from Last 3 Encounters:   07/25/23 (!) 156/72   06/24/19 136/73   10/02/18 150/83    Wt Readings from Last 3 Encounters:   07/25/23 58.5 kg (129 lb)   04/23/19 60.5 kg (133 lb 6.4 oz)   11/26/18 64.9 kg (143 lb)                  Mammogram Screening: Mammogram Screening - Patient over age 75, has elected to discontinue screenings.    Pertinent mammograms are reviewed under the imaging tab.    Review of Systems   Constitutional:  Negative for chills and fever.   HENT:  Positive for hearing loss. Negative for congestion, ear pain and sore throat.    Eyes:  Negative for pain and visual disturbance.   Respiratory:  Positive for cough. Negative for shortness of breath.    Cardiovascular:  Negative for chest pain, palpitations and peripheral edema.   Gastrointestinal:  Negative for abdominal pain, constipation, diarrhea, heartburn, hematochezia and nausea.   Breasts:  Negative for tenderness, breast mass and discharge.   Genitourinary:  Positive for urgency. Negative for dysuria, frequency, genital sores, hematuria, pelvic pain, vaginal bleeding and vaginal discharge.   Musculoskeletal:  Positive for arthralgias. Negative for joint swelling and myalgias.   Skin:  Negative for rash.   Neurological:  Positive for dizziness. Negative for weakness, headaches and  "paresthesias.   Psychiatric/Behavioral:  Negative for mood changes. The patient is nervous/anxious.        OBJECTIVE:   BP (!) 156/72 (BP Location: Right arm, Patient Position: Sitting, Cuff Size: Adult Regular)   Pulse 66   Resp 16   Ht 1.562 m (5' 1.5\")   Wt 58.5 kg (129 lb)   SpO2 95%   BMI 23.98 kg/m   Estimated body mass index is 23.98 kg/m  as calculated from the following:    Height as of this encounter: 1.562 m (5' 1.5\").    Weight as of this encounter: 58.5 kg (129 lb).  Physical Exam  GENERAL: healthy, alert and no distress  EYES: Eyes grossly normal to inspection, and conjunctivae and sclerae normal  HENT: ear canals and TM's normal, hard of hearing, nose with enlarged turbinates and mouth without ulcers or lesions  NECK: no adenopathy, no asymmetry, masses, or scars  RESP: lungs clear to auscultation - no rales, rhonchi or wheezes  CV: regular rate and rhythm, normal S1 S2, no S3 or S4, no murmur, click or rub, trace pitting peripheral edema bilateral lower extremities  ABDOMEN: soft, nontender, no hepatosplenomegaly, no masses and bowel sounds normal  MS: no gross musculoskeletal defects noted, no edema  SKIN: no suspicious lesions or rashes  NEURO: Normal strength and tone, mentation intact and speech normal  PSYCH: mentation appears normal, affect normal/bright     Results from this visitNo results found for any visits on 07/25/23.      ASSESSMENT / PLAN:   Polina was seen today for wellness visit.    Diagnoses and all orders for this visit:    Encounter for Medicare annual wellness exam  Patient is a very pleasant 88 year old who presents for annual visit. She has aged out of most screening.     Hypothyroidism, unspecified type  Patient is due for TSH, completed today. Refill of levothyroxine to be sent pending result.   -     TSH; Future  -     TSH    Benign essential hypertension  Blood pressure above goal today. Will have her check BP at home over the next 2 weeks, will check in with her " via telephone at that time. If remains elevated, plan to have her return to discuss BP control. This will be important moving forward given her history of aortic dilation.   -     Lipid panel reflex to direct LDL Fasting; Future  -     Lipid panel reflex to direct LDL Fasting    Recurrent major depressive disorder, in partial remission (H)  Refill today. Doing well  -     sertraline (ZOLOFT) 100 MG tablet; Take 1 tablet (100 mg) by mouth daily    S/P placement of cardiac pacemaker  Refill today. Doesn't pacemaker shock at all. Last cardiology appointment quite a while ago.   -     metoprolol tartrate (LOPRESSOR) 25 MG tablet; Take 1 tablet (25 mg) by mouth 2 times daily    Hyperlipidemia LDL goal <100  Refill today. Lipid panel completed today.   -     lovastatin (MEVACOR) 20 MG tablet; Take 1 tablet (20 mg) by mouth At Bedtime    CKD (chronic kidney disease) stage 2, GFR 60-89 ml/min  Decreased renal function. Will recheck BMP today  -     Basic metabolic panel; Future  -     Basic metabolic panel    Rhinorrhea  Chronic cough  Patient with occasional cough with phlegm production. We discussed typical cause of chronic cough including medications, rhinorrhea/allergies, and heartburn. Will trial flonase first given mildly enlarged turbinates bilaterally.   -     fluticasone (FLONASE) 50 MCG/ACT nasal spray; Spray 1 spray into both nostrils At Bedtime    Ascending aorta dilation (H)  History of aortic dilation of 4.0 cm last checked on echo June 2022 in [facility] system. Recheck echo ordered today for monitoring.   -     Echocardiogram Complete; Future    Other orders  -     PRIMARY CARE FOLLOW-UP SCHEDULING; Future        Patient has been advised of split billing requirements and indicates understanding: Yes      COUNSELING:  Reviewed preventive health counseling, as reflected in patient instructions  Special attention given to:       Regular exercise       Hearing screening       Bladder control       Alcohol Use         Osteoporosis prevention/bone health        She reports that she has never smoked. She has never used smokeless tobacco.      Appropriate preventive services were discussed with this patient, including applicable screening as appropriate for cardiovascular disease, diabetes, osteopenia/osteoporosis, and glaucoma.  As appropriate for age/gender, discussed screening for colorectal cancer, prostate cancer, breast cancer, and cervical cancer. Checklist reviewing preventive services available has been given to the patient.    Reviewed patients plan of care and provided an AVS. The Basic Care Plan (routine screening as documented in Health Maintenance) for Polina meets the Care Plan requirement. This Care Plan has been established and reviewed with the Patient.          Sharda Soliman MD  [facility]    Identified Health Risks:  I have reviewed Opioid Use Disorder and Substance Use Disorder risk factors and made any needed referrals.

## 2023-07-25 NOTE — PATIENT INSTRUCTIONS
Patient Education   Personalized Prevention Plan  You are due for the preventive services outlined below.  Your care team is available to assist you in scheduling these services.  If you have already completed any of these items, please share that information with your care team to update in your medical record.  Health Maintenance Due   Topic Date Due    ANNUAL REVIEW OF HM ORDERS  Never done    Zoster (Shingles) Vaccine (3 of 3) 01/21/2019    Thyroid Function Lab  11/26/2019    FALL RISK ASSESSMENT  11/26/2019    COVID-19 Vaccine (5 - Moderna series) 02/28/2023    Annual Wellness Visit  06/08/2023     Learning About Depression Screening  What is depression screening?  Depression screening is a way to see if you have depression symptoms. It may be done by a doctor or counselor. It's often part of a routine checkup. That's because your mental health is just as important as your physical health.  Depression is a mental health condition that affects how you feel, think, and act. You may:  Have less energy.  Lose interest in your daily activities.  Feel sad and grouchy for a long time.  Depression is very common. It affects people of all ages.  Many things can lead to depression. Some people become depressed after they have a stroke or find out they have a major illness like cancer or heart disease. The death of a loved one or a breakup may lead to depression. It can run in families. Most experts believe that a combination of inherited genes and stressful life events can cause it.  What happens during screening?  You may be asked to fill out a form about your depression symptoms. You and the doctor will discuss your answers. The doctor may ask you more questions to learn more about how you think, act, and feel.  What happens after screening?  If you have symptoms of depression, your doctor will talk to you about your options.  Doctors usually treat depression with medicines or counseling. Often, combining the two works  "best. Many people don't get help because they think that they'll get over the depression on their own. But people with depression may not get better unless they get treatment.  The cause of depression is not well understood. There may be many factors involved. But if you have depression, it's not your fault.  A serious symptom of depression is thinking about death or suicide. If you or someone you care about talks about this or about feeling hopeless, get help right away.  It's important to know that depression can be treated. Medicine, counseling, and self-care may help.  Where can you learn more?  Go to https://www.[employer].net/patiented  Enter T185 in the search box to learn more about \"Learning About Depression Screening.\"  Current as of: October 20, 2022               Content Version: 13.7    [id number] [employer].   Care instructions adapted under license by your healthcare professional. If you have questions about a medical condition or this instruction, always ask your healthcare professional. [employer] disclaims any warranty or liability for your use of this information.           Try the flonase (fluticasone) nasal spray first. 1-2 sprays in each nostril at bedtime. Maximum of 2 sprays in each nostril.     If that doesn't work after about a week or 2 of use, then try over the counter omeprazole or prilosec every morning before breakfast. See if that helps the cough.    Return for any new symptoms.     Blood Pressure Log    "

## 2023-07-27 RX ORDER — LEVOTHYROXINE SODIUM 88 UG/1
88 TABLET ORAL DAILY
Qty: 90 TABLET | Refills: 1 | Status: SHIPPED | OUTPATIENT
Start: 2023-07-27 | End: 2024-01-17

## 2023-07-28 ENCOUNTER — TELEPHONE (OUTPATIENT)
Dept: FAMILY MEDICINE | Facility: CLINIC | Age: 88
End: 2023-07-28
Payer: COMMERCIAL

## 2023-07-28 NOTE — TELEPHONE ENCOUNTER
Patient Returning Call    Reason for call:  pt calling wondering why it says on her after visit summary from 7/25 that she has 'CHRONIC KIDNEY DISEASE STAGE 2?  AND why did she get taken off her Calcium medication??    Information relayed to patient:  PCP or care team will call her back with answers by Monday or Tuesday of next week. Thank you    Patient has additional questions:  No      Could we send this information to you in [place] or would you prefer to receive a phone call?:   Patient would prefer a phone call   Okay to leave a detailed message?: Yes at Home number on file [phone] (home)

## 2023-07-31 NOTE — TELEPHONE ENCOUNTER
Patient has previously had mildly decreased renal function. There isn't anything additional to do about that right now other than to continue to monitor kidney function over time. We will start patients on medications like lisinopril to help protect the kidneys over time, but she has previously had a side effect of lisinopril. We could potentially consider a similar medication called Losartan in the future for better blood pressure control as well as to help protect the kidneys. Per our last visit, patient is to monitor home blood pressures. If they're elevated, I want her to follow up in clinic to discuss treatment. At that time, we can talk more about potentially adding losartan.     I didn't stop her calcium. There were 2 other duplicate prescriptions on her medication list, so I was cleaning that up. It makes it look like we stopped all calcium supplements, but we did not. There should still be a calcium supplement on her medication list.     Sharda Soliman MD

## 2023-07-31 NOTE — TELEPHONE ENCOUNTER
Pt called back and was given Dr Soliman's message. Pt BP at home has been 141/75,143/80, 129/75, 147/83.  Appt made for 8/8/2023. Marla Tristan RN

## 2023-08-01 ENCOUNTER — HOSPITAL ENCOUNTER (EMERGENCY)
Facility: CLINIC | Age: 88
Discharge: HOME | End: 2023-08-01
Attending: EMERGENCY MEDICINE | Admitting: EMERGENCY MEDICINE
Payer: COMMERCIAL

## 2023-08-01 ENCOUNTER — NURSE TRIAGE (OUTPATIENT)
Dept: NURSING | Facility: CLINIC | Age: 88
End: 2023-08-01
Payer: COMMERCIAL

## 2023-08-01 VITALS
RESPIRATION RATE: 14 BRPM | BODY MASS INDEX: 24.35 KG/M2 | HEART RATE: 71 BPM | HEIGHT: 61 IN | DIASTOLIC BLOOD PRESSURE: 97 MMHG | TEMPERATURE: 97.6 F | WEIGHT: 129 LBS | OXYGEN SATURATION: 95 % | SYSTOLIC BLOOD PRESSURE: 167 MMHG

## 2023-08-01 DIAGNOSIS — I10 HYPERTENSION, UNSPECIFIED TYPE: ICD-10-CM

## 2023-08-01 LAB
ANION GAP SERPL CALCULATED.3IONS-SCNC: 12 MMOL/L (ref 7–15)
BASOPHILS # BLD AUTO: 0 10E3/UL (ref 0–0.2)
BASOPHILS NFR BLD AUTO: 0 %
BUN SERPL-MCNC: 8.3 MG/DL (ref 8–23)
CALCIUM SERPL-MCNC: 9.3 MG/DL (ref 8.8–10.2)
CHLORIDE SERPL-SCNC: 94 MMOL/L (ref 98–107)
CREAT SERPL-MCNC: 0.59 MG/DL (ref 0.51–0.95)
DEPRECATED HCO3 PLAS-SCNC: 24 MMOL/L (ref 22–29)
EOSINOPHIL # BLD AUTO: 0.1 10E3/UL (ref 0–0.7)
EOSINOPHIL NFR BLD AUTO: 1 %
ERYTHROCYTE [DISTWIDTH] IN BLOOD BY AUTOMATED COUNT: 12.3 % (ref 10–15)
GFR SERPL CREATININE-BSD FRML MDRD: 86 ML/MIN/1.73M2
GLUCOSE SERPL-MCNC: 111 MG/DL (ref 70–99)
HCT VFR BLD AUTO: 41.1 % (ref 35–47)
HGB BLD-MCNC: 14.2 G/DL (ref 11.7–15.7)
HOLD SPECIMEN: NORMAL
HOLD SPECIMEN: NORMAL
IMM GRANULOCYTES # BLD: 0 10E3/UL
IMM GRANULOCYTES NFR BLD: 1 %
LYMPHOCYTES # BLD AUTO: 1.1 10E3/UL (ref 0.8–5.3)
LYMPHOCYTES NFR BLD AUTO: 24 %
MCH RBC QN AUTO: 29.5 PG (ref 26.5–33)
MCHC RBC AUTO-ENTMCNC: 34.5 G/DL (ref 31.5–36.5)
MCV RBC AUTO: 85 FL (ref 78–100)
MONOCYTES # BLD AUTO: 0.4 10E3/UL (ref 0–1.3)
MONOCYTES NFR BLD AUTO: 8 %
NEUTROPHILS # BLD AUTO: 3.1 10E3/UL (ref 1.6–8.3)
NEUTROPHILS NFR BLD AUTO: 66 %
NRBC # BLD AUTO: 0 10E3/UL
NRBC BLD AUTO-RTO: 0 /100
PLATELET # BLD AUTO: 203 10E3/UL (ref 150–450)
POTASSIUM SERPL-SCNC: 4.1 MMOL/L (ref 3.4–5.3)
RBC # BLD AUTO: 4.81 10E6/UL (ref 3.8–5.2)
SODIUM SERPL-SCNC: 130 MMOL/L (ref 136–145)
TROPONIN T SERPL HS-MCNC: 10 NG/L
WBC # BLD AUTO: 4.8 10E3/UL (ref 4–11)

## 2023-08-01 PROCEDURE — 36415 COLL VENOUS BLD VENIPUNCTURE: CPT | Performed by: EMERGENCY MEDICINE

## 2023-08-01 PROCEDURE — 84484 ASSAY OF TROPONIN QUANT: CPT | Performed by: EMERGENCY MEDICINE

## 2023-08-01 PROCEDURE — 93005 ELECTROCARDIOGRAM TRACING: CPT | Performed by: EMERGENCY MEDICINE

## 2023-08-01 PROCEDURE — 99284 EMERGENCY DEPT VISIT MOD MDM: CPT | Performed by: EMERGENCY MEDICINE

## 2023-08-01 PROCEDURE — 80048 BASIC METABOLIC PNL TOTAL CA: CPT | Performed by: EMERGENCY MEDICINE

## 2023-08-01 PROCEDURE — 99284 EMERGENCY DEPT VISIT MOD MDM: CPT | Mod: 25 | Performed by: EMERGENCY MEDICINE

## 2023-08-01 PROCEDURE — 93010 ELECTROCARDIOGRAM REPORT: CPT | Performed by: EMERGENCY MEDICINE

## 2023-08-01 PROCEDURE — 85004 AUTOMATED DIFF WBC COUNT: CPT | Performed by: EMERGENCY MEDICINE

## 2023-08-01 ASSESSMENT — ENCOUNTER SYMPTOMS
RESPIRATORY NEGATIVE: 1
GASTROINTESTINAL NEGATIVE: 1
EYES NEGATIVE: 1
MUSCULOSKELETAL NEGATIVE: 1
ALLERGIC/IMMUNOLOGIC NEGATIVE: 1
CARDIOVASCULAR NEGATIVE: 1
HEMATOLOGIC/LYMPHATIC NEGATIVE: 1
HEADACHES: 1
PSYCHIATRIC NEGATIVE: 1
ENDOCRINE NEGATIVE: 1

## 2023-08-01 ASSESSMENT — ACTIVITIES OF DAILY LIVING (ADL): ADLS_ACUITY_SCORE: 35

## 2023-08-01 NOTE — ED PROVIDER NOTES
History     Chief Complaint   Patient presents with    Hypertension     HPI  Polina Fernandez is a 88 year old female who presents with concern about hypertension and headache.    Patient's medical records show history of depression anxiety, hypothyroidism, hypertension, hyperlipidemia and history of cardiac pacemaker.    Patient's prescribed medications were reviewed    On examination patient was accompanied by her son- Roberto from home .  She reports that since her clinic visit she has been checking her blood pressure more frequently.  Records show she was seen on 7/25/2023.  She reports during her visit her blood pressure was elevated.  It was 156/72 she was advised to monitor.  She reports has been checking her pressures more frequently.  This morning she woke up and reported she felt like she had a headache.  She also reported that she did not have any facial numbness or speech difficulty or blurry vision and that she did not have any extremity weakness or numbness.  She was concerned that after checking her blood pressure a few times it concerned her be high and she wanted to come in to be evaluated.  She has been compliant with her prescribed occasions and is currently on metoprolol 25 mg twice daily.    Allergies:  Allergies   Allergen Reactions    Lisinopril Cough and Shortness Of Breath       Problem List:    Patient Active Problem List    Diagnosis Date Noted    S/P placement of cardiac pacemaker 07/25/2023     Priority: Medium    Major depressive disorder, recurrent severe without psychotic features (H) 03/22/2022     Priority: Medium    Generalized osteoarthritis of hand 07/09/2019     Priority: Medium     Formatting of this note might be different from the original. Created by Conversion Replacement Utility updated for latest IMO load Formatting of this note might be different from the original. Created by Conversion Replacement Utility updated for latest IMO load      Osteopenia with high risk of  fracture 07/09/2019     Priority: Medium     Formatting of this note might be different from the original. H/o wrist fracture and declining bone density '18--->secondary evaluation (negative).  Rx sent for alendronate 12/18 (pt did not start Rx) recheck DEXA one year Formatting of this note might be different from the original. H/o wrist fracture and declining bone density '18--->secondary evaluation (negative).  Rx sent for alendronate 12/18 (pt did not start Rx) recheck DEXA one year      Hyponatremia 04/27/2018     Priority: Medium    Depression with anxiety 04/26/2018     Priority: Medium    Hypothyroidism, unspecified type 04/26/2018     Priority: Medium    Benign essential hypertension 04/26/2018     Priority: Medium    Hyperlipidemia, unspecified hyperlipidemia type 04/26/2018     Priority: Medium    Tricompartment osteoarthritis of left knee 12/30/2014     Priority: Medium        Past Medical History:    Past Medical History:   Diagnosis Date    Hypercholesteremia     Hypertension        Past Surgical History:    Past Surgical History:   Procedure Laterality Date    APPENDECTOMY      BREAST CYST ASPIRATION      CONIZATION CERVIX,LOOP ELECTRD      Description: Cervical Conization Loop Electrode Excision;  Proc Date: 02/01/2005;     REMOVAL OF TONSILS,<11 Y/O      Description: Tonsillectomy;  Recorded: 01/28/2008;    HC REVISE MEDIAN N/CARPAL TUNNEL SURG      HC REVISE MEDIAN N/CARPAL TUNNEL SURG      Description: Neuroplasty Decompression Median Nerve At Carpal Tunnel;  Recorded: 11/02/2012;    TONSILLECTOMY      RUST APPENDECTOMY      Description: Appendectomy;  Recorded: 01/28/2008;    [facility] CONIZATION CERVIX, LOOP ELECTRODE EXCISION  02/01/2005       Family History:    Family History   Problem Relation Age of Onset    Lung Cancer Brother     Breast Cancer Sister     Lung Cancer Brother     Breast Cancer Sister     Pancreatic Cancer Sister     No Known Problems Mother     Myasthenia gravis Daughter      "Breast Cancer Sister     Hypertension Sister     Breast Cancer Sister     Cerebrovascular Disease Sister     Cerebral aneurysm Brother     Cancer Brother        Social History:  Marital Status:   [2]  Social History     Tobacco Use    Smoking status: Never    Smokeless tobacco: Never        Medications:    B-COMPLEX-C PO  Calcium Citrate 1040 MG TABS  cod liver oil CAPS capsule  Coenzyme Q10 (CO Q 10) 10 MG CAPS  fluticasone (FLONASE) 50 MCG/ACT nasal spray  Krill Oil 1000 MG CAPS  l-lysine HCl 500 MG TABS tablet  levothyroxine (SYNTHROID/LEVOTHROID) 88 MCG tablet  lovastatin (MEVACOR) 20 MG tablet  Lutein 6 MG CAPS  metoprolol tartrate (LOPRESSOR) 25 MG tablet  potassium aminobenzoate 500 MG CAPS capsule  Selenium 100 MCG TABS  sertraline (ZOLOFT) 100 MG tablet  VITAMIN C, CALCIUM ASCORBATE, PO  zinc gluconate 50 MG tablet          Review of Systems   Constitutional:         High blood pressure   HENT: Negative.     Eyes: Negative.    Respiratory: Negative.     Cardiovascular: Negative.    Gastrointestinal: Negative.    Endocrine: Negative.    Genitourinary: Negative.    Musculoskeletal: Negative.    Skin: Negative.    Allergic/Immunologic: Negative.    Neurological:  Positive for headaches (prior to ED arrival).   Hematological: Negative.    Psychiatric/Behavioral: Negative.     All other systems reviewed and are negative.      Physical Exam   BP: (!) 181/76  Pulse: 71  Temp: 97.6  F (36.4  C)  Resp: 18  Height: 154.9 cm (5' 1\")  Weight: 58.5 kg (129 lb)  SpO2: 97 %      Physical Exam  Constitutional:       General: She is not in acute distress.     Appearance: Normal appearance. She is not ill-appearing, toxic-appearing or diaphoretic.   HENT:      Head: Normocephalic and atraumatic.      Right Ear: Tympanic membrane normal.      Left Ear: Tympanic membrane normal.      Nose: Nose normal.   Eyes:      Extraocular Movements: Extraocular movements intact.      Pupils: Pupils are equal, round, and reactive to " light.   Cardiovascular:      Rate and Rhythm: Normal rate and regular rhythm.      Heart sounds:      No friction rub. No gallop.   Pulmonary:      Effort: Pulmonary effort is normal.      Breath sounds: Normal breath sounds.   Musculoskeletal:         General: No swelling, deformity or signs of injury.      Cervical back: Normal range of motion and neck supple.      Right lower leg: No edema.      Left lower leg: No edema.   Skin:     Capillary Refill: Capillary refill takes less than 2 seconds.      Coloration: Skin is not jaundiced or pale.      Findings: No bruising, erythema, lesion or rash.   Neurological:      General: No focal deficit present.      Mental Status: She is alert and oriented to person, place, and time.      Cranial Nerves: No cranial nerve deficit.      Sensory: No sensory deficit.      Motor: No weakness.      Coordination: Coordination normal.      Gait: Gait normal.      Deep Tendon Reflexes: Reflexes normal.   Psychiatric:         Mood and Affect: Mood normal.         Behavior: Behavior normal.         Thought Content: Thought content normal.         Judgment: Judgment normal.         ED Course                 Procedures              EKG Interpretation:      Interpreted by Radu Tineo MD  Time reviewed: 1348  Symptoms at time of EKG: None   Rhythm: normal sinus   Rate: Normal  Axis: Normal  Ectopy: none  Conduction: right bundle branch block (complete)  ST Segments/ T Waves: Non-specific ST-T wave changes  Q Waves: nonspecific  Comparison to prior: When compared with EKG dated 10/2/2018 no acute ischemia is appreciated.    Clinical Impression: Right bundle branch block    Critical Care time:  none             ED medications: none    ED Vitals:  Vitals:    08/01/23 1350 08/01/23 1355 08/01/23 1425 08/01/23 1445   BP: (!) 167/82 (!) 167/85 (!) 155/90 (!) 167/97   Pulse: 70 64  71   Resp: 10 14     Temp:       TempSrc:       SpO2: 95% 95%     Weight:       Height:             ED  labs and imaging:  Results for orders placed or performed during the hospital encounter of 08/01/23 (from the past 24 hour(s))   CBC with Platelets & Differential    Narrative    The following orders were created for panel order CBC with Platelets & Differential.  Procedure                               Abnormality         Status                     ---------                               -----------         ------                     CBC with platelets and d...[[id number]]                      Final result                 Please view results for these tests on the individual orders.   Basic metabolic panel   Result Value Ref Range    Sodium 130 (L) 136 - 145 mmol/L    Potassium 4.1 3.4 - 5.3 mmol/L    Chloride 94 (L) 98 - 107 mmol/L    Carbon Dioxide (CO2) 24 22 - 29 mmol/L    Anion Gap 12 7 - 15 mmol/L    Urea Nitrogen 8.3 8.0 - 23.0 mg/dL    Creatinine 0.59 0.51 - 0.95 mg/dL    Calcium 9.3 8.8 - 10.2 mg/dL    Glucose 111 (H) 70 - 99 mg/dL    GFR Estimate 86 >60 mL/min/1.73m2   Troponin T, High Sensitivity   Result Value Ref Range    Troponin T, High Sensitivity 10 <=14 ng/L   [place] Draw    Narrative    The following orders were created for panel order [place] Draw.  Procedure                               Abnormality         Status                     ---------                               -----------         ------                     Extra Blue Top Tube[[id number]]                              Final result               Extra Red Top Tube[[id number]]                               Final result                 Please view results for these tests on the individual orders.   CBC with platelets and differential   Result Value Ref Range    WBC Count 4.8 4.0 - 11.0 10e3/uL    RBC Count 4.81 3.80 - 5.20 10e6/uL    Hemoglobin 14.2 11.7 - 15.7 g/dL    Hematocrit 41.1 35.0 - 47.0 %    MCV 85 78 - 100 fL    MCH 29.5 26.5 - 33.0 pg    MCHC 34.5 31.5 - 36.5 g/dL    RDW 12.3 10.0 - 15.0 %    Platelet Count 203 150 - 450  10e3/uL    % Neutrophils 66 %    % Lymphocytes 24 %    % Monocytes 8 %    % Eosinophils 1 %    % Basophils 0 %    % Immature Granulocytes 1 %    NRBCs per 100 WBC 0 <1 /100    Absolute Neutrophils 3.1 1.6 - 8.3 10e3/uL    Absolute Lymphocytes 1.1 0.8 - 5.3 10e3/uL    Absolute Monocytes 0.4 0.0 - 1.3 10e3/uL    Absolute Eosinophils 0.1 0.0 - 0.7 10e3/uL    Absolute Basophils 0.0 0.0 - 0.2 10e3/uL    Absolute Immature Granulocytes 0.0 <=0.4 10e3/uL    Absolute NRBCs 0.0 10e3/uL   Extra Blue Top Tube   Result Value Ref Range    Hold Specimen JIC    Extra Red Top Tube   Result Value Ref Range    Hold Specimen JIC            Assessments & Plan (with Medical Decision Making)   Assessment Summar and Clinical impression: 88-year-old female who presented with concern about hypertension, headache patient reported symptoms began this morning at 630am prior to evaluation.  She has a known history of hypertension currently on metoprolol.  Blood pressure on arrival was 181/76.  She was afebrile 97% on room air.  Patient arrived asymptomatic.  She no neurologic deficits.  We discussed her elevated blood pressure readings in clinic and her blood pressure readings during her visit today.  Given symptoms described on intake a work-up was undertaken which revealed no emergency condition or diagnosis.  Discharged with plan to follow-up with her clinic provider in 1 week for blood pressure management and medication management.  Low threshold to return for evaluation if there are new concerns or symptoms     ED Course and plan:  Reviewed the medical record.  Office visit on 7/25/2023.  Prior imaging on 11/17/2021-see details outlined in the medical record.  We discussed her elevated blood pressure readings at home and also in the clinic during her evaluation 7/24/2023.  Blood pressure seem to improve during her stay.  She had no concerning symptoms specifically no chest pain or headache EKG on arrival when compared with EKG from  10/2/2018 revealed no acute ischemia.  Right bundle branch block appears new.  Troponin is normal on arrival. After period of care and observation patient did not require any therapies to manage her symptoms and she expressed comfort going home with plan to keep upcoming clinic visit for follow-up on August 8, 2023 we discussed timing of checking her blood pressure at home.  We also reviewed concerning symptoms including reasons to return for reevaluation.  Patient and son at the bedside expressed understanding and agreement with plan of care.  We also reviewed TTE findings on 6/27/2022-noted in her clinic visit including ascending aorta that was noted to be dilated with maximal diameter of 4.0 we discussed her scheduled order for TTE for follow-up evaluation-currently ordered by her PCP.  Exam was not concerning for aortic dissection although we discussed this cannot be excluded without further imaging.  Patient and son present during ED course expressed comfort, understanding.          Disclaimer: This note consists of symbols derived from keyboarding, dictation and/or voice recognition software. As a result, there may be errors in the script that have gone undetected. Please consider this when interpreting information found in this chart.   I have reviewed the nursing notes.    I have reviewed the findings, diagnosis, plan and need for follow up with the patient.           Medical Decision Making  The patient's presentation was of moderate complexity (an acute illness with systemic symptoms).    The patient's evaluation involved:  ordering and/or review of 1 test(s) in this encounter (diagnostic labs)    The patient's management necessitated moderate risk (prescription drug management including medications given in the ED).        Discharge Medication List as of 8/1/2023  2:47 PM          Final diagnoses:   Hypertension, unspecified type       8/1/2023   [facility] EMERGENCY DEPT       Rivka  Radu Dixon MD  08/01/23 0872

## 2023-08-01 NOTE — ED TRIAGE NOTES
"Pt states \" I have high blood pressure\"  pt had a headache earlier and left shoulder pain about 0630, it comes and goes\"  pt denies pain at this time     Triage Assessment       Row Name 08/01/23 1208       Triage Assessment (Adult)    Airway WDL WDL       Respiratory WDL    Respiratory WDL WDL                    "

## 2023-08-01 NOTE — TELEPHONE ENCOUNTER
Nurse Triage SBAR    Is this a 2nd Level Triage? NO    Situation: Patient calling with a high blood pressure reading this morning.  Consent: not needed    Background: Patient blood pressure has been going up steadily the last 4-5 days. Patient took her metoprolol 25 mg at 11am after getting a reading of 172/93    Assessment:   BP at 11am was 172/93, rechecked now - 186/64, Pulse 73  Intermittent pain left shoulder  Headache  Shakey  No chest pain   No SOB  No blurry vision    Protocol Recommended Disposition:   Go to ED Now    Recommendation: Advised patient to Go to ED now . Son is with her and able to take her to the [place] ED. Care advice given. Patient verbalized understanding and agreed with plan.     Doreen Shelton RN [facility] 8/1/2023 11:16 AM    Reason for Disposition   Systolic BP >= 160 OR Diastolic >= 100, and any cardiac or neurologic symptoms (e.g., chest pain, difficulty breathing, unsteady gait, blurred vision)    Additional Information   Negative: Sounds like a life-threatening emergency to the triager   Negative: Symptom is main concern (e.g., headache, chest pain)   Negative: Low blood pressure is main concern    Protocols used: Blood Pressure - High-A-OH

## 2023-08-01 NOTE — DISCHARGE INSTRUCTIONS
1) Your evaluation today did not reveal an emergency diagnosis or condition.  We discussed follow-up care with your clinic provider with upcoming visit in one week given your recent blood pressure readings.    2) Although you appear stable for discharge to home at this time if you develop new symptoms of concern you should return to be reevaluated

## 2023-08-08 ENCOUNTER — OFFICE VISIT (OUTPATIENT)
Dept: FAMILY MEDICINE | Facility: CLINIC | Age: 88
End: 2023-08-08
Payer: COMMERCIAL

## 2023-08-08 VITALS
HEIGHT: 61 IN | SYSTOLIC BLOOD PRESSURE: 152 MMHG | OXYGEN SATURATION: 96 % | WEIGHT: 128 LBS | DIASTOLIC BLOOD PRESSURE: 74 MMHG | HEART RATE: 68 BPM | RESPIRATION RATE: 14 BRPM | BODY MASS INDEX: 24.17 KG/M2

## 2023-08-08 DIAGNOSIS — M79.622 PAIN OF LEFT UPPER ARM: ICD-10-CM

## 2023-08-08 DIAGNOSIS — I10 BENIGN ESSENTIAL HYPERTENSION: Primary | ICD-10-CM

## 2023-08-08 PROCEDURE — 99214 OFFICE O/P EST MOD 30 MIN: CPT | Performed by: STUDENT IN AN ORGANIZED HEALTH CARE EDUCATION/TRAINING PROGRAM

## 2023-08-08 ASSESSMENT — PAIN SCALES - GENERAL: PAINLEVEL: MODERATE PAIN (5)

## 2023-08-08 NOTE — PATIENT INSTRUCTIONS
If you had an Echo/Heart Monitor/Cardiac Cath or other cardiac testing ordered the number is [phone] to schedule or change your appointment.       What Is OMT (Osteopathic Manipulative Treatment)?    As part of their education, Angel (doctor of osteopathic medicine) receive special training in the musculoskeletal system (your nerves, bones and muscles).     OMT involves using the hands to diagnose, treat and prevent illness or injury.  Using OMT, your osteopathic physician will move your muscles and joints using techniques including stretching, gentle pressure and resistance.     OMT can help people of all ages and backgrounds. In addition to muscle or joint pain, it can be used to treat a variety of conditions such as asthma, sinus pain, migraines and carpal tunnel syndrome.     For more information, please visit [email or web address]    How to Schedule OMT :  There are limited providers in the area who perform OMT. Call the following clinics and get scheduled with one of the listed physicians for OMT consult.    [place]  [facility]  [phone]  DO Lawson Sanders  [phone]  Family Medicine  Dada Hernandez,     Sports Medicine  Elliot Cai,     [place]  [facility]  [phone]  Dieudonne Kapaln,     [facility]  [phone]  Dieudonne Kaplan,     [facility]  [phone]  Dieudonne Kaplan,     [facility]  [phone]  Kareen Michael, DO  Tomer Humphreys, DO  Nayana Steinberg, DO  Bernadine Mahajan, DO  Rita Flannery, DO  Denisa Pruitt, DO  Gabi Gary, DO  Adriano Griffith, DO  Babatunde Garrido, DO  Stephanie Soriano, DO      *If you are not able to get in with a DO physician, some of our local physical therapists are well trained in manual manipulation, very similar to OMT. Please contact our clinic for a physical therapy referral prior to attempting to schedule with them.*    [facility] [facility]  [phone]  Nick  Josep Noonan

## 2023-08-08 NOTE — PROGRESS NOTES
Assessment & Plan     Benign essential hypertension  Patient is a very pleasant 88-year-old female with past medical history of hypertension who presents today for follow-up on hypertension.  Patient was seen couple weeks ago by myself, noted to have hypertension during her annual visit.  Plan to recheck home blood pressures, follow-up today.  In the interim, she did have an elevated blood pressure in the 170s, presented to the emergency department.  Work-up there was negative including no changes in EKG, normal troponin.  She has been checking her blood pressures at home, primarily in the 120s to 130s systolic over 70s to 80s.  Occasionally with higher numbers in the low 140s, and rare numbers in the 150s or 160s, in the 1 number in the 170s for systolic.  Overall, I feel her blood pressure control is actually quite good, and she does admit that during the times of higher blood pressures, she was feeling little more anxious.  She was in particular more anxious about her aortic dilation, noted at 4 cm a year ago.  We discussed that this is not terribly concerning at this time, but we should obtain the echocardiogram as previously ordered.    For now, continue to monitor home blood pressures, follow-up with RN here in clinic with her home blood pressure cuff to ensure home numbers are appropriate. Will contact her in 1-2 weeks to follow up on home numbers.  If blood pressures at home are appropriate, continue to measure intermittently a couple times a week, and when symptomatic.  Has a history of allergies to lisinopril, so would avoid ACEs and ARB's given the shortness of breath she experienced.  Consider diuretic as next step, particularly since her heart rates are already in the 60s on a beta-blocker.  - PRIMARY CARE FOLLOW-UP SCHEDULING    Pain of left upper arm  Patient is noting some intermittent pain in the left arm.  Nonreproducible on exam today.  She reports a history of neck issues.  No step-off today.   "We discussed consideration for OMT, though options here in the area are limited.  Consider referral to physical therapy for manual therapy if patient desires, given information on this today.      I spent a total of 35 minutes on the day of the visit.   Time spent by me doing chart review, history and exam, documentation and further activities per the note. much of today's visit was spent on counseling regarding her chronic conditions.     MED REC REQUIRED  Post Medication Reconciliation Status:  Discharge medications reconciled, continue medications without change    Sharda Soliman MD  [facility]    Monae Fish is a 88 year old, presenting for the following health issues:  ER F/U        8/8/2023     2:20 PM   Additional Questions   Roomed by Anna ZHOU   Accompanied by Self       HPI     ED/UC Followup:    Facility:  [place]  Date of visit: 08/01/2023  Reason for visit: Hypertension  Current Status: improved    Brings log today:   Primarily in th 120-130 systolic/70-80, hr in the 60-70's.     For BP: metoprolol.    Thiazide?    No shortness of breath.     Throbbing I the neck, feelign it daily, last briefly.     Was more anxious about the aorta.       Left arm pain.   [place] shoulder down to elbow.     Review of Systems   Constitutional, HEENT, cardiovascular, pulmonary, GI, , musculoskeletal, neuro, skin, endocrine and psych systems are negative, except as otherwise noted.      Objective    BP (!) 152/74 (BP Location: Right arm, Patient Position: Sitting, Cuff Size: Adult Regular)   Pulse 68   Resp 14   Ht 1.549 m (5' 1\")   Wt 58.1 kg (128 lb)   SpO2 96%   BMI 24.19 kg/m    Body mass index is 24.19 kg/m .  Physical Exam  Constitutional:       Appearance: Normal appearance.   HENT:      Head: Normocephalic.   Eyes:      General: No scleral icterus.     Extraocular Movements: Extraocular movements intact.      Conjunctiva/sclera: Conjunctivae normal.   Cardiovascular: "      Rate and Rhythm: Normal rate.   Pulmonary:      Effort: Pulmonary effort is normal.   Musculoskeletal:         General: No tenderness (of left shoulder).      Cervical back: No tenderness.   Neurological:      General: No focal deficit present.      Mental Status: She is alert and oriented to person, place, and time.          Results from last visit:  Admission on 08/01/2023, Discharged on 08/01/2023   Component Date Value Ref Range Status    Sodium 08/01/2023 130 (L)  136 - 145 mmol/L Final    Potassium 08/01/2023 4.1  3.4 - 5.3 mmol/L Final    Chloride 08/01/2023 94 (L)  98 - 107 mmol/L Final    Carbon Dioxide (CO2) 08/01/2023 24  22 - 29 mmol/L Final    Anion Gap 08/01/2023 12  7 - 15 mmol/L Final    Urea Nitrogen 08/01/2023 8.3  8.0 - 23.0 mg/dL Final    Creatinine 08/01/2023 0.59  0.51 - 0.95 mg/dL Final    Calcium 08/01/2023 9.3  8.8 - 10.2 mg/dL Final    Glucose 08/01/2023 111 (H)  70 - 99 mg/dL Final    GFR Estimate 08/01/2023 86  >60 mL/min/1.73m2 Final    Troponin T, High Sensitivity 08/01/2023 10  <=14 ng/L Final    Either a High Sensitivity Troponin T baseline (0 hours) value = 100 ng/L, or an increase in High Sensitivity Troponin T = 7 ng/L at 2 hours compared to 0 hours (2-0 hours), suggests myocardial injury, and urgent clinical attention is required.    If the 2-0 hours increase is <7 ng/L, a High Sensitivity Troponin T result above gender-specific reference ranges warrants further evaluation.   Recommendations for further evaluation include correlation with clinical decision-making tool (e.g., HEART), a 3rd High Sensitivity Troponin T test 2 hours after the 2nd (a 20% change from baseline would represent concern), admission for observation, close PCC/cardiology follow-up, or urgent outpatient provocative testing.    WBC Count 08/01/2023 4.8  4.0 - 11.0 10e3/uL Final    RBC Count 08/01/2023 4.81  3.80 - 5.20 10e6/uL Final    Hemoglobin 08/01/2023 14.2  11.7 - 15.7 g/dL Final    Hematocrit  08/01/2023 41.1  35.0 - 47.0 % Final    MCV 08/01/2023 85  78 - 100 fL Final    MCH 08/01/2023 29.5  26.5 - 33.0 pg Final    MCHC 08/01/2023 34.5  31.5 - 36.5 g/dL Final    RDW 08/01/2023 12.3  10.0 - 15.0 % Final    Platelet Count 08/01/2023 203  150 - 450 10e3/uL Final    % Neutrophils 08/01/2023 66  % Final    % Lymphocytes 08/01/2023 24  % Final    % Monocytes 08/01/2023 8  % Final    % Eosinophils 08/01/2023 1  % Final    % Basophils 08/01/2023 0  % Final    % Immature Granulocytes 08/01/2023 1  % Final    NRBCs per 100 WBC 08/01/2023 0  <1 /100 Final    Absolute Neutrophils 08/01/2023 3.1  1.6 - 8.3 10e3/uL Final    Absolute Lymphocytes 08/01/2023 1.1  0.8 - 5.3 10e3/uL Final    Absolute Monocytes 08/01/2023 0.4  0.0 - 1.3 10e3/uL Final    Absolute Eosinophils 08/01/2023 0.1  0.0 - 0.7 10e3/uL Final    Absolute Basophils 08/01/2023 0.0  0.0 - 0.2 10e3/uL Final    Absolute Immature Granulocytes 08/01/2023 0.0  <=0.4 10e3/uL Final    Absolute NRBCs 08/01/2023 0.0  10e3/uL Final    Hold Specimen 08/01/2023 [facility]   Final    Hold Specimen 08/01/2023 [facility]   Final

## 2023-08-10 ENCOUNTER — ALLIED HEALTH/NURSE VISIT (OUTPATIENT)
Dept: FAMILY MEDICINE | Facility: CLINIC | Age: 88
End: 2023-08-10
Attending: STUDENT IN AN ORGANIZED HEALTH CARE EDUCATION/TRAINING PROGRAM
Payer: COMMERCIAL

## 2023-08-10 ENCOUNTER — TELEPHONE (OUTPATIENT)
Dept: FAMILY MEDICINE | Facility: CLINIC | Age: 88
End: 2023-08-10

## 2023-08-10 VITALS — DIASTOLIC BLOOD PRESSURE: 80 MMHG | SYSTOLIC BLOOD PRESSURE: 138 MMHG | HEART RATE: 64 BPM

## 2023-08-10 DIAGNOSIS — I10 BENIGN ESSENTIAL HYPERTENSION: ICD-10-CM

## 2023-08-10 PROCEDURE — 99207 PR NO CHARGE NURSE ONLY: CPT

## 2023-08-10 NOTE — TELEPHONE ENCOUNTER
Polina Fernandez is a 88 year old year old patient who comes in today for a Blood Pressure check because of ongoing blood pressure monitoring.  Vital Signs as repeated by RN BP /70, BP also checked with patients home BP machine reading was 143/80, recheck /80.  Patient is taking medication as prescribed  Patient is tolerating medications well.  Patient is monitoring Blood Pressure at home.  Average readings if yes are 140-150/70's  Current complaints: none  Disposition:  patient to continue with the same medication, check BP daily report readings that are consistently > 140/90 and patient reminded to call as needed.    Julie Behrendt RN

## 2023-08-10 NOTE — PROGRESS NOTES
Polina Fernandez is a 88 year old year old patient who comes in today for a Blood Pressure check because of ongoing blood pressure monitoring.  Vital Signs as repeated by RN BP /70, BP also checked with patients home BP machine reading was 152/83, recheck /70.  Patient is taking medication as prescribed  Patient is tolerating medications well.  Patient is monitoring Blood Pressure at home.  Average readings if yes are 140-150/70's  Current complaints: none  Disposition:  patient to continue with the same medication, check BP daily report readings that are consistently > 140/90 and patient reminded to call as needed.    Julie Behrendt RN

## 2023-08-28 ENCOUNTER — TELEPHONE (OUTPATIENT)
Dept: FAMILY MEDICINE | Facility: CLINIC | Age: 88
End: 2023-08-28
Payer: COMMERCIAL

## 2023-08-28 NOTE — TELEPHONE ENCOUNTER
Pt said she took it yesterday and it was   8/17 - noon 125/68  10 pm 132/73  8/19   4:15 pm 127/74  8/23   5:20 pm 134/78  8/27  4:30 133/80    [facility] as she had one with Dr. Soliman in [facility]. She does have some Issues with her Feet/ Hand Cramps, numb feeling in toes. She would like to be seen for this.    [place] Sec

## 2023-08-28 NOTE — TELEPHONE ENCOUNTER
Please contact patient and get updated home blood pressure numbers, then route the encounter back to me to evaluate for any needed change in medications.     Also, check in with patient and see if she still needs the appointment with Dr. Nguyen on 9/1/23    Sharda Soliman MD

## 2023-08-28 NOTE — TELEPHONE ENCOUNTER
----- Message from Alicia Briggs MD sent at 7/9/2021  4:24 PM CDT -----  UA unremarkable for infection. Continue with plan for labs as scheduled tomorrow.    Home BP well within normal range, no indication for additional treatment at this time. Follow up with me as needed.     Glad patient is able to utilize upcoming appointment with Dr. Nguyen for acute issues.     Sharda Soliman MD

## 2023-09-27 ENCOUNTER — HOSPITAL ENCOUNTER (OUTPATIENT)
Dept: CARDIOLOGY | Facility: CLINIC | Age: 88
Discharge: HOME | End: 2023-09-27
Attending: STUDENT IN AN ORGANIZED HEALTH CARE EDUCATION/TRAINING PROGRAM | Admitting: STUDENT IN AN ORGANIZED HEALTH CARE EDUCATION/TRAINING PROGRAM
Payer: COMMERCIAL

## 2023-09-27 DIAGNOSIS — I77.810 ASCENDING AORTA DILATION (H): ICD-10-CM

## 2023-09-27 LAB — LVEF ECHO: NORMAL

## 2023-09-27 PROCEDURE — 93306 TTE W/DOPPLER COMPLETE: CPT | Mod: 26 | Performed by: INTERNAL MEDICINE

## 2023-09-27 PROCEDURE — 93306 TTE W/DOPPLER COMPLETE: CPT

## 2023-10-10 ENCOUNTER — IMMUNIZATION (OUTPATIENT)
Dept: FAMILY MEDICINE | Facility: CLINIC | Age: 88
End: 2023-10-10
Payer: COMMERCIAL

## 2023-10-10 DIAGNOSIS — Z23 ENCOUNTER FOR IMMUNIZATION: Primary | ICD-10-CM

## 2023-10-10 PROCEDURE — 99207 PR NO CHARGE NURSE ONLY: CPT

## 2023-10-10 PROCEDURE — 90662 IIV NO PRSV INCREASED AG IM: CPT

## 2023-10-10 PROCEDURE — 90471 IMMUNIZATION ADMIN: CPT

## 2023-10-10 NOTE — PROGRESS NOTES
Prior to immunization administration, verified patients identity using patient s name and date of birth. Please see Immunization Activity for additional information.     Screening Questionnaire for Adult Immunization    Are you sick today?   No   Do you have allergies to medications, food, a vaccine component or latex?   No   Have you ever had a serious reaction after receiving a vaccination?   No   Do you have a long-term health problem with heart, lung, kidney, or metabolic disease (e.g., diabetes), asthma, a blood disorder, no spleen, complement component deficiency, a cochlear implant, or a spinal fluid leak?  Are you on long-term aspirin therapy?   No   Do you have cancer, leukemia, HIV/AIDS, or any other immune system problem?   No   Do you have a parent, brother, or sister with an immune system problem?   No   In the past 3 months, have you taken medications that affect  your immune system, such as prednisone, other steroids, or anticancer drugs; drugs for the treatment of rheumatoid arthritis, Crohn s disease, or psoriasis; or have you had radiation treatments?   No   Have you had a seizure, or a brain or other nervous system problem?   No   During the past year, have you received a transfusion of blood or blood    products, or been given immune (gamma) globulin or antiviral drug?   No   For women: Are you pregnant or is there a chance you could become       pregnant during the next month?   No   Have you received any vaccinations in the past 4 weeks?   No     Immunization questionnaire answers were all negative.    I have reviewed the following standing orders:   This patient is due and qualifies for the Influenza vaccine.    Click here for Influenza Vaccine Standing Order    I have reviewed the vaccines inclusion and exclusion criteria; No concerns regarding eligibility.     Patient instructed to remain in clinic for 15 minutes afterwards, and to report any adverse reactions.     Screening performed by  Candy Asher MA on 10/10/2023 at 2:16 PM.

## 2024-01-17 ENCOUNTER — TELEPHONE (OUTPATIENT)
Dept: FAMILY MEDICINE | Facility: CLINIC | Age: 89
End: 2024-01-17
Payer: COMMERCIAL

## 2024-01-17 DIAGNOSIS — E03.9 HYPOTHYROIDISM, UNSPECIFIED TYPE: ICD-10-CM

## 2024-01-17 RX ORDER — LEVOTHYROXINE SODIUM 88 UG/1
88 TABLET ORAL DAILY
Qty: 90 TABLET | Refills: 0 | Status: SHIPPED | OUTPATIENT
Start: 2024-01-17 | End: 2024-01-26

## 2024-01-17 RX ORDER — LEVOTHYROXINE SODIUM 88 UG/1
88 TABLET ORAL DAILY
Qty: 90 TABLET | Refills: 0 | OUTPATIENT
Start: 2024-01-17

## 2024-01-17 RX ORDER — LEVOTHYROXINE SODIUM 88 UG/1
88 TABLET ORAL DAILY
Qty: 90 TABLET | Refills: 1 | OUTPATIENT
Start: 2024-01-17

## 2024-01-17 NOTE — TELEPHONE ENCOUNTER
I talked with Polina and told her she is due for lab in a month or so and explained why to her.  Last TSH was borderline .  Pau Moses RN

## 2024-01-17 NOTE — TELEPHONE ENCOUNTER
Teresa says the pharmacy told her we denied the refill of her thyroid med.She wants this to go to the  specialty/mail pharmacy and not the  [facility] pharmacy. She has about 2 weeks left.   I told her she was due for her TSH. She can't understand why her numbers would change. She says she doesn't have transportation to come to the clinic to do this.

## 2024-01-25 ENCOUNTER — LAB (OUTPATIENT)
Dept: LAB | Facility: CLINIC | Age: 89
End: 2024-01-25
Payer: COMMERCIAL

## 2024-01-25 DIAGNOSIS — E03.9 HYPOTHYROIDISM, UNSPECIFIED TYPE: ICD-10-CM

## 2024-01-25 LAB — TSH SERPL DL<=0.005 MIU/L-ACNC: 3.32 UIU/ML (ref 0.3–4.2)

## 2024-01-25 PROCEDURE — 84443 ASSAY THYROID STIM HORMONE: CPT

## 2024-01-25 PROCEDURE — 36415 COLL VENOUS BLD VENIPUNCTURE: CPT

## 2024-01-26 DIAGNOSIS — E03.9 HYPOTHYROIDISM, UNSPECIFIED TYPE: ICD-10-CM

## 2024-01-26 RX ORDER — LEVOTHYROXINE SODIUM 88 UG/1
88 TABLET ORAL DAILY
Qty: 90 TABLET | Refills: 3 | Status: SHIPPED | OUTPATIENT
Start: 2024-01-26

## 2024-02-28 ENCOUNTER — OFFICE VISIT (OUTPATIENT)
Dept: URGENT CARE | Facility: URGENT CARE | Age: 89
End: 2024-02-28
Payer: COMMERCIAL

## 2024-02-28 ENCOUNTER — TRANSFERRED RECORDS (OUTPATIENT)
Dept: HEALTH INFORMATION MANAGEMENT | Facility: CLINIC | Age: 89
End: 2024-02-28

## 2024-02-28 VITALS
OXYGEN SATURATION: 94 % | BODY MASS INDEX: 23.05 KG/M2 | SYSTOLIC BLOOD PRESSURE: 164 MMHG | DIASTOLIC BLOOD PRESSURE: 79 MMHG | RESPIRATION RATE: 18 BRPM | TEMPERATURE: 98.6 F | WEIGHT: 122 LBS | HEART RATE: 74 BPM

## 2024-02-28 DIAGNOSIS — H53.9 VISUAL DISTURBANCE: Primary | ICD-10-CM

## 2024-02-28 PROCEDURE — 99213 OFFICE O/P EST LOW 20 MIN: CPT | Performed by: EMERGENCY MEDICINE

## 2024-02-28 NOTE — PROGRESS NOTES
CHIEF COMPLAINT: Visual disturbance          HPI: Patient is an 89-year-old female who earlier this morning noted the onset of visual disturbance in her left eye.  She describes that intermittent loss of vision and inability to recognize objects.  She felt the symptoms only lasted a few minutes.  She had a similar episode involving the right eye in mid February.  She feels as if her vision has returned to normal now.  She has a history of cataract surgery in the past but has no chronic ophthalmologic problems.  She does describe some numbness in her left hand earlier today which is not there anymore.  No leg face or arm symptoms in terms of weakness.  Patient also has had chronic dizziness.  She has chronic instability with no recent change.      ROS: See HPI otherwise normal.    Allergies   Allergen Reactions    Lisinopril Cough and Shortness Of Breath      Current Outpatient Medications   Medication Sig Dispense Refill    B-COMPLEX-C PO Take 1 tablet by mouth every evening      Calcium Citrate 1040 MG TABS Take 500 mg by mouth      cod liver oil CAPS capsule       Coenzyme Q10 (CO Q 10) 10 MG CAPS Take 1 capsule by mouth      fluticasone (FLONASE) 50 MCG/ACT nasal spray Spray 1 spray into both nostrils At Bedtime 15.8 mL 1    Krill Oil 1000 MG CAPS       l-lysine HCl 500 MG TABS tablet Take 100 mg by mouth daily      levothyroxine (SYNTHROID/LEVOTHROID) 88 MCG tablet Take 1 tablet (88 mcg) by mouth daily 90 tablet 3    lovastatin (MEVACOR) 20 MG tablet Take 1 tablet (20 mg) by mouth At Bedtime 90 tablet 3    Lutein 6 MG CAPS       metoprolol tartrate (LOPRESSOR) 25 MG tablet Take 1 tablet (25 mg) by mouth 2 times daily 180 tablet 3    potassium aminobenzoate 500 MG CAPS capsule       Selenium 100 MCG TABS Take 200 mg by mouth      sertraline (ZOLOFT) 100 MG tablet Take 1 tablet (100 mg) by mouth daily 90 tablet 3    VITAMIN C, CALCIUM ASCORBATE, PO Chew and swallow 1 tablet daily.      zinc gluconate 50 MG tablet  Take 50 mg by mouth daily           PE: No acute distress.  Blood pressure 164/79.  Pulse 74 regular.  Respiratory rate 18.  She is afebrile.  HEENT reveals PERRLA.  EOMI.  Cranial nerves are symmetrical intact.  Normal carotid upstrokes.  Heart regular rate and rhythm with no prominent murmur.  Extremities show symmetric intact sensorimotor exam with special attention to the left hand.  Gait is stable.        TREATMENT: None.      ASSESSMENT: History of recent intermittent visual disturbance.  Concern for TIA versus retinal source of symptoms.  Appointment made with Dr. Wilde to see patient today at 330.  Further direction in terms of her care today will be based on their findings.      Her granddaughter in law will help her set up a follow-up appointment with Dr. Soliman to check discussed her other chronic symptoms.      DIAGNOSIS: Visual disturbance.      PLAN: See ophthalmology today at 330.  Follow-up appointment should be set up with Dr. Soliman and granddaughter in law will assist.  She may be return to urgent care if any concerns.

## 2024-02-28 NOTE — PATIENT INSTRUCTIONS
See Dr. Wilde at 330 today show [facility]  Go to the emergency department if any further hand numbness.

## 2024-03-04 ENCOUNTER — TRANSFERRED RECORDS (OUTPATIENT)
Dept: HEALTH INFORMATION MANAGEMENT | Facility: CLINIC | Age: 89
End: 2024-03-04
Payer: COMMERCIAL

## 2024-03-14 ENCOUNTER — OFFICE VISIT (OUTPATIENT)
Dept: FAMILY MEDICINE | Facility: CLINIC | Age: 89
End: 2024-03-14
Payer: COMMERCIAL

## 2024-03-14 VITALS
HEIGHT: 61 IN | OXYGEN SATURATION: 95 % | TEMPERATURE: 97.6 F | HEART RATE: 73 BPM | WEIGHT: 122 LBS | RESPIRATION RATE: 16 BRPM | SYSTOLIC BLOOD PRESSURE: 154 MMHG | DIASTOLIC BLOOD PRESSURE: 74 MMHG | BODY MASS INDEX: 23.03 KG/M2

## 2024-03-14 DIAGNOSIS — Z95.0 S/P PLACEMENT OF CARDIAC PACEMAKER: ICD-10-CM

## 2024-03-14 DIAGNOSIS — H53.9 VISUAL DISTURBANCE: Primary | ICD-10-CM

## 2024-03-14 DIAGNOSIS — I45.10 RBBB: ICD-10-CM

## 2024-03-14 DIAGNOSIS — E78.5 HYPERLIPIDEMIA LDL GOAL <100: ICD-10-CM

## 2024-03-14 LAB
CHOLEST SERPL-MCNC: 190 MG/DL
FASTING STATUS PATIENT QL REPORTED: YES
HDLC SERPL-MCNC: 75 MG/DL
LDLC SERPL CALC-MCNC: 101 MG/DL
NONHDLC SERPL-MCNC: 115 MG/DL
TRIGL SERPL-MCNC: 68 MG/DL

## 2024-03-14 PROCEDURE — 80061 LIPID PANEL: CPT | Performed by: STUDENT IN AN ORGANIZED HEALTH CARE EDUCATION/TRAINING PROGRAM

## 2024-03-14 PROCEDURE — 36415 COLL VENOUS BLD VENIPUNCTURE: CPT | Performed by: STUDENT IN AN ORGANIZED HEALTH CARE EDUCATION/TRAINING PROGRAM

## 2024-03-14 PROCEDURE — 99213 OFFICE O/P EST LOW 20 MIN: CPT | Performed by: STUDENT IN AN ORGANIZED HEALTH CARE EDUCATION/TRAINING PROGRAM

## 2024-03-14 ASSESSMENT — ANXIETY QUESTIONNAIRES
IF YOU CHECKED OFF ANY PROBLEMS ON THIS QUESTIONNAIRE, HOW DIFFICULT HAVE THESE PROBLEMS MADE IT FOR YOU TO DO YOUR WORK, TAKE CARE OF THINGS AT HOME, OR GET ALONG WITH OTHER PEOPLE: NOT DIFFICULT AT ALL
2. NOT BEING ABLE TO STOP OR CONTROL WORRYING: NOT AT ALL
4. TROUBLE RELAXING: NOT AT ALL
GAD7 TOTAL SCORE: 1
5. BEING SO RESTLESS THAT IT IS HARD TO SIT STILL: NOT AT ALL
3. WORRYING TOO MUCH ABOUT DIFFERENT THINGS: NOT AT ALL
1. FEELING NERVOUS, ANXIOUS, OR ON EDGE: SEVERAL DAYS
7. FEELING AFRAID AS IF SOMETHING AWFUL MIGHT HAPPEN: NOT AT ALL
6. BECOMING EASILY ANNOYED OR IRRITABLE: NOT AT ALL
GAD7 TOTAL SCORE: 1
7. FEELING AFRAID AS IF SOMETHING AWFUL MIGHT HAPPEN: NOT AT ALL
8. IF YOU CHECKED OFF ANY PROBLEMS, HOW DIFFICULT HAVE THESE MADE IT FOR YOU TO DO YOUR WORK, TAKE CARE OF THINGS AT HOME, OR GET ALONG WITH OTHER PEOPLE?: NOT DIFFICULT AT ALL
GAD7 TOTAL SCORE: 1

## 2024-03-14 ASSESSMENT — PATIENT HEALTH QUESTIONNAIRE - PHQ9
SUM OF ALL RESPONSES TO PHQ QUESTIONS 1-9: 0
SUM OF ALL RESPONSES TO PHQ QUESTIONS 1-9: 0
10. IF YOU CHECKED OFF ANY PROBLEMS, HOW DIFFICULT HAVE THESE PROBLEMS MADE IT FOR YOU TO DO YOUR WORK, TAKE CARE OF THINGS AT HOME, OR GET ALONG WITH OTHER PEOPLE: NOT DIFFICULT AT ALL

## 2024-03-14 ASSESSMENT — PAIN SCALES - GENERAL: PAINLEVEL: NO PAIN (0)

## 2024-03-14 NOTE — PROGRESS NOTES
Assessment & Plan     Visual disturbance  Patient is a pleasant 89 year old who presents today for follow up from recent  visit for visual disturbance. She was seen on 2/28/24 in  with reports of intermittent loss of vision and flashes in her vision primarily on the left (previously with similar symptoms on the right). She was able to be seen same day by [facility]. She reports having then been seen by eye specialists (presumably also through [facility]) and has yet to hear results. Last episode lasted about 5 minutes and occurred 3 days ago. She has not had long lasting neurologic or vision deficits, so I have lower concern for retinal detachment. If no obvious cause for symptoms is found on eye exam, will plan brain imaging.     Hyperlipidemia LDL goal <100  Patient requests lipid panel be completed early this year as she is fasting today. This was collected and resulted with improvement in LDL and total cholesterol. LDL is 1 point above goal. No medication changes.   - Lipid panel reflex to direct LDL Fasting  - Lipid panel reflex to direct LDL Fasting    S/P placement of cardiac pacemaker  RBBB  Patient has a pacemaker and previously was being seen through [facility]. However, it doesn't seem that her pacemaker has been interogated recently as she missed her appointment this past fall. She has RBBB noted last year on EKG. Will refer internally to cardiology to establish care and for pacemaker evaluation.   - Adult Cardiology Eval  Referral        I spent a total of 28 minutes on the day of the visit.   Time spent by me doing chart review, history and exam, documentation and further activities per the note    Subjective   Polina is a 89 year old, presenting for the following health issues:  Urgent Care, Hypertension, and Lipids        3/14/2024     4:15 PM   Additional Questions   Roomed by Rosi   Accompanied by Cass - friend     History of Present Illness       Hyperlipidemia:  She  "presents for follow up of hyperlipidemia.   She is taking medication to lower cholesterol. She is not having myalgia or other side effects to statin medications.    Hypertension: She presents for follow up of hypertension.  She does not check blood pressure  regularly outside of the clinic. Outside blood pressures have been over 140/90. She follows a low salt diet.     Reason for visit:  Vision problems    She eats 4 or more servings of fruits and vegetables daily.She consumes 0 sweetened beverage(s) daily.She exercises with enough effort to increase her heart rate 9 or less minutes per day.  She exercises with enough effort to increase her heart rate 3 or less days per week.   She is taking medications regularly.         ED/UC Followup:    Facility:  [place]  Date of visit: 2/28/2024  Reason for visit: visual disturbance  Current Status: went to eye doctor and several tests - waiting results    Sometimes a bright light/letters missing when reading  Couple times this past week almost looked like a little wall of red/orange light.   Lasted at least 5 minutes the last time.     Eye specialist - took a couple different tests. One was an optic nerve test.     Suspected glaucoma, then was seen in specialty.     [place] eye specialist.   Hasn't yet heard from them on what the plan was  How often episodes?   Not that often. Been 2 times when it was colorful like a red orange.   Usually it was more of a light where the letters should be.   Not all the time, just sometimes.     Not checking BP anymore.   Made her more anxious.     Specialty clinic  [phone]  Visual field   Oct of optic nerve    Review of Systems  Constitutional, HEENT, cardiovascular, pulmonary, gi and gu systems are negative, except as otherwise noted.      Objective    BP (!) 154/74 (BP Location: Right arm)   Pulse 73   Temp 97.6  F (36.4  C) (Tympanic)   Resp 16   Ht 1.549 m (5' 1\")   Wt 55.3 kg (122 lb)   SpO2 95%   BMI 23.05 kg/m    Body " mass index is 23.05 kg/m .  Physical Exam  Constitutional:       Appearance: Normal appearance.   HENT:      Head: Normocephalic.   Eyes:      General: No scleral icterus.     Extraocular Movements: Extraocular movements intact.      Conjunctiva/sclera: Conjunctivae normal.   Cardiovascular:      Rate and Rhythm: Normal rate.   Pulmonary:      Effort: Pulmonary effort is normal.   Neurological:      General: No focal deficit present.      Mental Status: She is alert and oriented to person, place, and time.                  Signed Electronically by: Sharda Soliman MD

## 2024-03-15 ENCOUNTER — TELEPHONE (OUTPATIENT)
Dept: FAMILY MEDICINE | Facility: CLINIC | Age: 89
End: 2024-03-15
Payer: COMMERCIAL

## 2024-03-15 DIAGNOSIS — H53.9 VISUAL DISTURBANCE: Primary | ICD-10-CM

## 2024-03-15 NOTE — TELEPHONE ENCOUNTER
Patient called stating she was told to contact Dr. Sharda Soliman today if she has not heard back from [facility] regarding her test results.     Update sent to Dr. Soliman.    Julie Behrendt RN

## 2024-03-15 NOTE — TELEPHONE ENCOUNTER
Huddled with Dr. Soliman who states patient was advised to follow up with [facility] for the test results.     Detailed message left for patient instructing to contact [facility].    Julie Behrendt RN

## 2024-04-01 NOTE — TELEPHONE ENCOUNTER
Great, this is helpful information. Sounds like [facility] is still working up for primary ophthalmologic condition. Will hold off on brain imaging until their workup is complete. If they don't feel an obvious eye issue is the main cause, let me know and we will plan brain MRI.     Sharda Soliman MD

## 2024-04-01 NOTE — TELEPHONE ENCOUNTER
Patient calling back. She was in to see [facility] today. States they did more test and they will send over her report.     Patient states she is still having issues with her eyes. States Dr. Soliman mentioned getting some scans done on her head and wondering if Dr. Soliman can place orders for those scans? Or does patient need a follow up? If appt Is needed, ok to use a sameday?

## 2024-04-02 ENCOUNTER — TELEPHONE (OUTPATIENT)
Dept: FAMILY MEDICINE | Facility: CLINIC | Age: 89
End: 2024-04-02
Payer: COMMERCIAL

## 2024-04-02 NOTE — TELEPHONE ENCOUNTER
Pt was notified that she can take her glaucoma eye drops and that MRI of Brain is ordered and they will call her to set up an appt.

## 2024-04-02 NOTE — TELEPHONE ENCOUNTER
Patient returned call to clinic. Relayed provider message to patient. Patient is still wanting provider to consider ordering MRI as she is convinced her problem is more than just her eye.  Reports [facility] has started her on an eye drop for possible glaucoma and she is supposed to follow up with them after 1 month to re-evaluate. She has not picked up and started eye drops yet but plans to do so.   Routing to provider for consideration of ordering MRI.     Angeles Low, RN

## 2024-04-03 ENCOUNTER — TELEPHONE (OUTPATIENT)
Dept: FAMILY MEDICINE | Facility: CLINIC | Age: 89
End: 2024-04-03
Payer: COMMERCIAL

## 2024-04-03 NOTE — TELEPHONE ENCOUNTER
Reason for call: MR Device Safety Clearance    Please create a MR Device Safety order  Patient is reporting patient has Pacemaker  Type of MR exam:MR BRAIN W ORBITS

## 2024-04-04 NOTE — TELEPHONE ENCOUNTER
Is the implanted device safe for MRI Exam? Yes  Is this device 3T compatible? Yes  Device Type: Pacemaker      Device Information: Medtronic, PPM Cactus (D)      Cardiology Orders for Device Programming     -- Yes -- The patient has a MRI conditional pulse generator and leads from the same     -- Yes -- The pulse generator and leads have been implanted for at least 6 weeks. (Does not apply to [employer] devices)    -- Yes-- The device is implanted in the right or left pectoral region    -- Yes -- There are not any additional active cardiac devices, abandoned leads, lead extenders or adapters (not MHFV device patient, needs xray verification)     -- Yes -- The device lead impedance measurements are within the normal range per  guidelines    -- N/A -- If the patient is pacemaker dependent the thresholds are less than or equal to 2.0V @ 0.4ms.    -- Yes -- RA and RV leads are programmed to bipolar pacing operation or pacing off. If no, CONTACT THE DEVICE REP FOR PROGRAMMING     Date of last Remote Device check: 1/9/2024  Results of last Device check:  1.   Right atrium impedance: 456 Ohms   2.   Right ventricle impedance: 551 Ohms   3.   Left ventricle impedance: n/a      4.   Right atrium threshold: 0.625V @ 0.4 ms   5.   Right ventricle threshold: 0.375V @ 0.4 ms   6.   Left ventricle threshold: n/a      Device programming during the scan guidelines   Pacing Mode (check one): Use the recommended pacing mode per Medtronic MRI Access Application  Pacing Rate: n/a   bpm   If remote reprogramming is applicable, please contact the Rep to assist       Jo Purvis RN           Viral syndrome

## 2024-05-09 ENCOUNTER — TELEPHONE (OUTPATIENT)
Dept: FAMILY MEDICINE | Facility: CLINIC | Age: 89
End: 2024-05-09
Payer: COMMERCIAL

## 2024-05-09 NOTE — TELEPHONE ENCOUNTER
Reason for Call:  Appointment Request    Patient requesting this type of appt:  Hospital/ED Follow-Up     Requested provider: Sharda Soliman    Reason patient unable to be scheduled: Not within requested timeframe    When does patient want to be seen/preferred time:  as soon as possible.     Comments: Requesting to see you for a ED follow up, rash. Also scheduled for a upcoming brain MRI and has concerns about her risks with having a pacemaker.     Could we [facility] this [facility] to you [facility] or would you prefer to receive a phone call?:   Patient would prefer a phone call   Okay to leave a detailed message?: Yes at Cell number on file:    Telephone Information:   Mobile [phone]       Call taken on 5/9/2024 at 2:32 PM by Rachael Garrido

## 2024-05-09 NOTE — TELEPHONE ENCOUNTER
Patient scheduled for 5/28 with Dr. Soliman. Patient wanting to wait to see Sharda. Will call back if needing to be seen sooner by alternative provider

## 2024-05-14 ENCOUNTER — HOSPITAL ENCOUNTER (OUTPATIENT)
Dept: RADIOLOGY | Facility: HOSPITAL | Age: 89
Discharge: HOME | End: 2024-05-14
Attending: STUDENT IN AN ORGANIZED HEALTH CARE EDUCATION/TRAINING PROGRAM
Payer: COMMERCIAL

## 2024-05-14 ENCOUNTER — TRANSFERRED RECORDS (OUTPATIENT)
Dept: HEALTH INFORMATION MANAGEMENT | Facility: CLINIC | Age: 89
End: 2024-05-14

## 2024-05-14 ENCOUNTER — HOSPITAL ENCOUNTER (OUTPATIENT)
Dept: MRI IMAGING | Facility: HOSPITAL | Age: 89
Discharge: HOME | End: 2024-05-14
Attending: STUDENT IN AN ORGANIZED HEALTH CARE EDUCATION/TRAINING PROGRAM
Payer: COMMERCIAL

## 2024-05-14 VITALS — OXYGEN SATURATION: 94 % | HEART RATE: 69 BPM

## 2024-05-14 DIAGNOSIS — H53.9 VISUAL DISTURBANCE: ICD-10-CM

## 2024-05-14 PROCEDURE — 71045 X-RAY EXAM CHEST 1 VIEW: CPT

## 2024-05-14 PROCEDURE — 255N000002 HC RX 255 OP 636: Performed by: STUDENT IN AN ORGANIZED HEALTH CARE EDUCATION/TRAINING PROGRAM

## 2024-05-14 PROCEDURE — A9585 GADOBUTROL INJECTION: HCPCS | Performed by: STUDENT IN AN ORGANIZED HEALTH CARE EDUCATION/TRAINING PROGRAM

## 2024-05-14 PROCEDURE — 70553 MRI BRAIN STEM W/O & W/DYE: CPT

## 2024-05-14 RX ORDER — GADOBUTROL 604.72 MG/ML
6 INJECTION INTRAVENOUS ONCE
Status: COMPLETED | OUTPATIENT
Start: 2024-05-14 | End: 2024-05-14

## 2024-05-14 RX ADMIN — GADOBUTROL 6 ML: 604.72 INJECTION INTRAVENOUS at 14:15

## 2024-05-28 ENCOUNTER — OFFICE VISIT (OUTPATIENT)
Dept: FAMILY MEDICINE | Facility: CLINIC | Age: 89
End: 2024-05-28
Payer: COMMERCIAL

## 2024-05-28 VITALS
DIASTOLIC BLOOD PRESSURE: 78 MMHG | HEART RATE: 66 BPM | RESPIRATION RATE: 12 BRPM | HEIGHT: 61 IN | OXYGEN SATURATION: 96 % | WEIGHT: 121 LBS | SYSTOLIC BLOOD PRESSURE: 158 MMHG | BODY MASS INDEX: 22.84 KG/M2

## 2024-05-28 DIAGNOSIS — R13.10 DYSPHAGIA, UNSPECIFIED TYPE: ICD-10-CM

## 2024-05-28 DIAGNOSIS — L84 CALLUS OF FOOT: ICD-10-CM

## 2024-05-28 DIAGNOSIS — M20.42 HAMMER TOE OF SECOND TOE OF LEFT FOOT: ICD-10-CM

## 2024-05-28 DIAGNOSIS — M20.41 HAMMER TOE OF SECOND TOE OF RIGHT FOOT: ICD-10-CM

## 2024-05-28 DIAGNOSIS — H40.003 GLAUCOMA SUSPECT, BILATERAL: Primary | ICD-10-CM

## 2024-05-28 DIAGNOSIS — I10 BENIGN ESSENTIAL HYPERTENSION: ICD-10-CM

## 2024-05-28 PROCEDURE — G2211 COMPLEX E/M VISIT ADD ON: HCPCS | Performed by: STUDENT IN AN ORGANIZED HEALTH CARE EDUCATION/TRAINING PROGRAM

## 2024-05-28 PROCEDURE — 99215 OFFICE O/P EST HI 40 MIN: CPT | Performed by: STUDENT IN AN ORGANIZED HEALTH CARE EDUCATION/TRAINING PROGRAM

## 2024-05-28 RX ORDER — LOSARTAN POTASSIUM 50 MG/1
50 TABLET ORAL DAILY
Qty: 90 TABLET | Refills: 3 | Status: SHIPPED | OUTPATIENT
Start: 2024-05-28

## 2024-05-28 RX ORDER — TIMOLOL MALEATE 5 MG/ML
SOLUTION/ DROPS OPHTHALMIC
COMMUNITY
Start: 2024-05-07

## 2024-05-28 ASSESSMENT — PAIN SCALES - GENERAL: PAINLEVEL: NO PAIN (0)

## 2024-05-28 NOTE — PROGRESS NOTES
"  Assessment & Plan   Patient is a very pleasant 89-year-old female who presents today for follow-up for recent emergency department visit, brain MRI, and below issues. With regards to the emergency department visit for bug bites, this has resolved, no ongoing issues.      Glaucoma suspect, bilateral  With regards to the MRI, no obvious findings for explanation of her eye symptoms, continues to follow-up with ophthalmology for presumed glaucoma.  Brain MRI did find diffuse parenchymal volume loss.    Hammer toe of second toe of left foot  Hammer toe of second toe of right foot  Callus of foot  Patient notes that she has deformity of both feet with hammertoe of the second toe on both feet, a callus on the left foot which has become quite painful, has difficulty with managing it herself.  We discussed limited options in our clinic, referred to podiatry for ongoing management.  In the interim, encouraged increased foot soaks.  - Orthopedic  Referral    Dysphagia, unspecified type  Patient notes that she has had some difficulty with swallowing for many years, states that she needs to be careful with thin liquids such as water, has to be very mindful in transitioning the water from the mouth into the pharynx and esophagus as this will occasionally cause her to sputtering cough.  Refer to speech therapy for initial evaluation.  May require further imaging pending speech therapy evaluation.  - Speech Therapy  Referral  - PRIMARY CARE FOLLOW-UP SCHEDULING    Benign essential hypertension  Lastly, we discussed ongoing high blood pressure.  Patient did have some episodes of \"kaleidoscope\" vision, which is possibly related to the glaucoma she is experiencing as above.  We did discuss that her blood pressure was elevated during these times, has been elevated in clinic.  Recommended that we consider increasing blood pressure regimen.  Unfortunately, due to her history of cardiac arrhythmia, heart rates already " in the 60s, I hesitate to increase metoprolol.  We opted to start losartan at 50 mg, could decrease dosage if need be.  Will monitor blood pressures at home 3-4 times a week, will notify me if blood pressures drop consistently below 120/60, or she becomes symptomatic.  Follow-up in 2 months with annual visit, sooner if side effects.  - losartan (COZAAR) 50 MG tablet  Dispense: 90 tablet; Refill: 3  - PRIMARY CARE FOLLOW-UP SCHEDULING      MED REC REQUIRED   Post Medication Reconciliation Status:     The longitudinal plan of care for the diagnosis(es)/condition(s) as documented were addressed during this visit. Due to the added complexity in care, I will continue to support Polina in the subsequent management and with ongoing continuity of care.    I spent a total of 45 minutes on the day of the visit.   Time spent by me doing chart review, history and exam, documentation and further activities per the note    Subjective   Polina is a 89 year old, presenting for the following health issues:  Follow Up (05/14/2024- Brain MRI) and ER F/U        5/28/2024     2:27 PM   Additional Questions   Roomed by Anna ZHOU   Accompanied by Daughter Jen     HPI       ED/UC Followup:    Facility:  [facility]  Date of visit: 04/30/2024  Reason for visit: Allergic reaction to insect bite  Current Status: better  No ongoing itches or rashes.    Feet:   Hammer toe and toe big toes want to crowd over on the other ones.   Have a callous on the left foot bone.   Has a device for the right foot to separate the two toes.     Using some eye drops.   Thinks she has glaucoma.   Goes back next [place]  3-4 episodes in march, saw kaleidoscope or missing letters or sometimes, nothing in April 23 and 25 of may came back.   Took blood pressure   March 155/88, 158/87  May 153/88, 144/79    Review of Systems  Constitutional, HEENT, cardiovascular, pulmonary, gi and gu systems are negative, except as otherwise noted.      Objective   "  BP (!) 158/78 (BP Location: Right arm, Patient Position: Sitting, Cuff Size: Adult Regular)   Pulse 66   Resp 12   Ht 1.549 m (5' 1\")   Wt 54.9 kg (121 lb)   SpO2 96%   BMI 22.86 kg/m    Body mass index is 22.86 kg/m .  Physical Exam  Constitutional:       Appearance: Normal appearance.   HENT:      Head: Normocephalic.   Eyes:      General: No scleral icterus.     Extraocular Movements: Extraocular movements intact.      Conjunctiva/sclera: Conjunctivae normal.   Cardiovascular:      Rate and Rhythm: Normal rate.   Pulmonary:      Effort: Pulmonary effort is normal.   Feet:      Comments: Bilateral 2nd digit hammertoe, right worse than left with  mild erythema on the pip joint. Erythematous, tender and calloused area at the MCP joint laterally of the great toe on the left.  Neurological:      General: No focal deficit present.      Mental Status: She is alert and oriented to person, place, and time.                  Signed Electronically by: Sharda Soliman MD    "

## 2024-06-19 ENCOUNTER — THERAPY VISIT (OUTPATIENT)
Dept: SPEECH THERAPY | Facility: CLINIC | Age: 89
End: 2024-06-19
Attending: STUDENT IN AN ORGANIZED HEALTH CARE EDUCATION/TRAINING PROGRAM
Payer: COMMERCIAL

## 2024-06-19 DIAGNOSIS — R05.3 CHRONIC COUGH: ICD-10-CM

## 2024-06-19 DIAGNOSIS — R13.10 DYSPHAGIA, UNSPECIFIED TYPE: Primary | ICD-10-CM

## 2024-06-19 DIAGNOSIS — R13.10 DYSPHAGIA, UNSPECIFIED TYPE: ICD-10-CM

## 2024-06-19 PROCEDURE — 92610 EVALUATE SWALLOWING FUNCTION: CPT | Mod: GN | Performed by: SPEECH-LANGUAGE PATHOLOGIST

## 2024-06-19 NOTE — PROGRESS NOTES
Please let patient know that, per recommendations of the speech/voice therapist, she should be seen by ENT to evaluate other causes of the cough. I put in the referral today.     Sharda Soliman MD

## 2024-06-19 NOTE — PROGRESS NOTES
SPEECH LANGUAGE PATHOLOGY EVALUATION       Fall Risk Screen:  Fall screen completed by: SLP  Have you fallen 2 or more times in the past year?: No  Have you fallen and had an injury in the past year?: No  Is patient a fall risk?: No    Subjective      Presenting condition or subjective complaint: coughs sometimes when drinking liquids or coughs on saliva.  Occurs every day.  Date of onset: 05/28/24 (order date)    Relevant medical history:   Per 5/28/24 MD note: Patient notes that she has had some difficulty with swallowing for many years, states that she needs to be careful with thin liquids such as water, has to be very mindful in transitioning the water from the mouth into the pharynx and esophagus as this will occasionally cause her to sputtering cough. Refer to speech therapy for initial evaluation. May require further imaging pending speech therapy evaluation.       Prior diagnostic imaging/testing results:     no  Prior therapy history for the same diagnosis, illness or injury:    no,however has been present for multiple years (5-10 years)    Living Environment  Social support:   lives with son in [place], [place]  Pt is independent at home and doesn't do a lot of cooking.  Avoids spicy foods.      Patient goals for therapy:  To be able to drink and not cough.    Pain assessment: Pain denied     Objective     SWALLOW EVALUTION  Dysphagia history: Pt reports she has been coughing for about ten years and finally tired of it so mentioned to her MD.  Pt states she sometimes chokes on her saliva and at times when drinking water.  But also that she drinks small sips of water to prevent the episodes.  Pt has no history of stoke or neuro diagnoses.  Pt denies heartburn or known GERD    Current Diet/Method of Nutritional Intake: regular diet        CLINICAL SWALLOW EVALUATION  Oral Motor Function: generally intact   Dentition: own teeth with upper partial.  Pt reports no difficulty chewing.    Level of assist required  for feeding: no assistance needed   Textures Trialed:   Clinical Swallow Eval: Thin Liquids  Mode of presentation: cup, self-fed   Volume presented: single and consecutive sip for 6 oz glass of water  Preparatory Phase: WFL  Oral Phase: WFL  Pharyngeal phase of swallow: intact    Diagnostic statement: WNL    Clinical Swallow Eval: Purees  Mode of presentation: spoon, self-fed   Volume presented: appropriate bite sizes  Preparatory Phase: WFL  Oral Phase: WFL  Pharyngeal phase of swallow: intact    Diagnostic statement: WNL    Clinical Swallow Eval: Solids  Mode of presentation: self-fed   Volume presented: kailash crackers  Preparatory Phase: WFL  Oral Phase: WFL  Pharyngeal phase of swallow: intact    Diagnostic statement: WNL       ESOPHAGEAL PHASE OF SWALLOW  no observed or reported concerns related to esophageal function     SWALLOW ASSESSMENT CLINICAL IMPRESSIONS AND RATIONALE  Diet Consistency Recommendations: regular diet    Medication Administration Recommendations: per pr preference      Assessment & Plan   CLINICAL IMPRESSIONS   Medical Diagnosis: Dysphagia, unspecified type    Treatment Diagnosis:       Impression/Assessment: Pt is a 89 year old female with coughing complaints. The following significant findings have been identified:  pt had coughing episode with watering eyes x2 unrelated to oral intake as occurred prior to any oral trials.  Pt stated this is a good representation of what occurs at home.  No overt s/sx dysphagia during evaluation today with 6 oz of water (in total) or pudding or solid textues.   Oral motor skills WNL.  Timely pharyngeal swallow response. Pt appears an low risk of aspiration.   Recommend pt have consult for ENT for further diagnosis.    Recommended Referrals to Other Professionals:  ENT  Education Assessment:   Learner/Method: Patient  Education Comments: results and recommendations of assessment    Risks and benefits of evaluation/treatment have been explained.    Patient/Family/caregiver agrees with Plan of Care.     Evaluation Time:    SLP Eval: oral/pharyngeal swallow function, clinical minutes (89674): 30      Signing Clinician: CÉSAR Canseco

## 2024-07-03 DIAGNOSIS — E78.5 HYPERLIPIDEMIA LDL GOAL <100: ICD-10-CM

## 2024-07-03 DIAGNOSIS — Z95.0 S/P PLACEMENT OF CARDIAC PACEMAKER: ICD-10-CM

## 2024-07-03 DIAGNOSIS — F33.41 RECURRENT MAJOR DEPRESSIVE DISORDER, IN PARTIAL REMISSION (H): ICD-10-CM

## 2024-07-03 RX ORDER — METOPROLOL TARTRATE 25 MG/1
25 TABLET, FILM COATED ORAL 2 TIMES DAILY
Qty: 180 TABLET | Refills: 1 | Status: SHIPPED | OUTPATIENT
Start: 2024-07-03

## 2024-07-03 RX ORDER — SERTRALINE HYDROCHLORIDE 100 MG/1
100 TABLET, FILM COATED ORAL DAILY
Qty: 90 TABLET | Refills: 1 | Status: SHIPPED | OUTPATIENT
Start: 2024-07-03

## 2024-07-03 RX ORDER — LOVASTATIN 20 MG
20 TABLET ORAL AT BEDTIME
Qty: 90 TABLET | Refills: 1 | Status: SHIPPED | OUTPATIENT
Start: 2024-07-03

## 2024-07-30 ENCOUNTER — OFFICE VISIT (OUTPATIENT)
Dept: FAMILY MEDICINE | Facility: CLINIC | Age: 89
End: 2024-07-30
Payer: COMMERCIAL

## 2024-07-30 VITALS
HEART RATE: 66 BPM | DIASTOLIC BLOOD PRESSURE: 74 MMHG | WEIGHT: 119 LBS | BODY MASS INDEX: 22.47 KG/M2 | RESPIRATION RATE: 12 BRPM | SYSTOLIC BLOOD PRESSURE: 116 MMHG | HEIGHT: 61 IN | OXYGEN SATURATION: 96 %

## 2024-07-30 DIAGNOSIS — E03.9 HYPOTHYROIDISM, UNSPECIFIED TYPE: ICD-10-CM

## 2024-07-30 DIAGNOSIS — E78.5 HYPERLIPIDEMIA LDL GOAL <100: ICD-10-CM

## 2024-07-30 DIAGNOSIS — Z00.00 ROUTINE GENERAL MEDICAL EXAMINATION AT A HEALTH CARE FACILITY: Primary | ICD-10-CM

## 2024-07-30 DIAGNOSIS — I10 BENIGN ESSENTIAL HYPERTENSION: ICD-10-CM

## 2024-07-30 DIAGNOSIS — I77.810 ASCENDING AORTA DILATION (H): ICD-10-CM

## 2024-07-30 DIAGNOSIS — F33.42 RECURRENT MAJOR DEPRESSIVE DISORDER, IN FULL REMISSION (H): ICD-10-CM

## 2024-07-30 PROBLEM — F33.2 MAJOR DEPRESSIVE DISORDER, RECURRENT SEVERE WITHOUT PSYCHOTIC FEATURES (H): Status: RESOLVED | Noted: 2022-03-22 | Resolved: 2024-07-30

## 2024-07-30 LAB
ANION GAP SERPL CALCULATED.3IONS-SCNC: 9 MMOL/L (ref 7–15)
BUN SERPL-MCNC: 7 MG/DL (ref 8–23)
CALCIUM SERPL-MCNC: 9 MG/DL (ref 8.8–10.4)
CHLORIDE SERPL-SCNC: 99 MMOL/L (ref 98–107)
CHOLEST SERPL-MCNC: 191 MG/DL
CREAT SERPL-MCNC: 0.64 MG/DL (ref 0.51–0.95)
EGFRCR SERPLBLD CKD-EPI 2021: 84 ML/MIN/1.73M2
FASTING STATUS PATIENT QL REPORTED: YES
FASTING STATUS PATIENT QL REPORTED: YES
GLUCOSE SERPL-MCNC: 98 MG/DL (ref 70–99)
HCO3 SERPL-SCNC: 26 MMOL/L (ref 22–29)
HDLC SERPL-MCNC: 67 MG/DL
LDLC SERPL CALC-MCNC: 106 MG/DL
NONHDLC SERPL-MCNC: 124 MG/DL
POTASSIUM SERPL-SCNC: 4.2 MMOL/L (ref 3.4–5.3)
SODIUM SERPL-SCNC: 134 MMOL/L (ref 135–145)
TRIGL SERPL-MCNC: 90 MG/DL
TSH SERPL DL<=0.005 MIU/L-ACNC: 1.75 UIU/ML (ref 0.3–4.2)

## 2024-07-30 PROCEDURE — 84443 ASSAY THYROID STIM HORMONE: CPT | Performed by: STUDENT IN AN ORGANIZED HEALTH CARE EDUCATION/TRAINING PROGRAM

## 2024-07-30 PROCEDURE — 99397 PER PM REEVAL EST PAT 65+ YR: CPT | Performed by: STUDENT IN AN ORGANIZED HEALTH CARE EDUCATION/TRAINING PROGRAM

## 2024-07-30 PROCEDURE — 80061 LIPID PANEL: CPT | Performed by: STUDENT IN AN ORGANIZED HEALTH CARE EDUCATION/TRAINING PROGRAM

## 2024-07-30 PROCEDURE — 99214 OFFICE O/P EST MOD 30 MIN: CPT | Mod: 25 | Performed by: STUDENT IN AN ORGANIZED HEALTH CARE EDUCATION/TRAINING PROGRAM

## 2024-07-30 PROCEDURE — 80048 BASIC METABOLIC PNL TOTAL CA: CPT | Performed by: STUDENT IN AN ORGANIZED HEALTH CARE EDUCATION/TRAINING PROGRAM

## 2024-07-30 PROCEDURE — 36415 COLL VENOUS BLD VENIPUNCTURE: CPT | Performed by: STUDENT IN AN ORGANIZED HEALTH CARE EDUCATION/TRAINING PROGRAM

## 2024-07-30 SDOH — HEALTH STABILITY: PHYSICAL HEALTH: ON AVERAGE, HOW MANY DAYS PER WEEK DO YOU ENGAGE IN MODERATE TO STRENUOUS EXERCISE (LIKE A BRISK WALK)?: 2 DAYS

## 2024-07-30 ASSESSMENT — PATIENT HEALTH QUESTIONNAIRE - PHQ9
10. IF YOU CHECKED OFF ANY PROBLEMS, HOW DIFFICULT HAVE THESE PROBLEMS MADE IT FOR YOU TO DO YOUR WORK, TAKE CARE OF THINGS AT HOME, OR GET ALONG WITH OTHER PEOPLE: NOT DIFFICULT AT ALL
SUM OF ALL RESPONSES TO PHQ QUESTIONS 1-9: 0
SUM OF ALL RESPONSES TO PHQ QUESTIONS 1-9: 0

## 2024-07-30 ASSESSMENT — SOCIAL DETERMINANTS OF HEALTH (SDOH): HOW OFTEN DO YOU GET TOGETHER WITH FRIENDS OR RELATIVES?: ONCE A WEEK

## 2024-07-30 ASSESSMENT — PAIN SCALES - GENERAL: PAINLEVEL: NO PAIN (0)

## 2024-07-30 NOTE — LETTER
August 1, 2024      Polina Fernandez  [address] [address]        Dear ,    We are writing to inform you of your test results. Overall, they're stable or even a little improved from previous. No changes ot any medications at this time.     Resulted Orders   Basic metabolic panel   Result Value Ref Range    Sodium 134 (L) 135 - 145 mmol/L    Potassium 4.2 3.4 - 5.3 mmol/L    Chloride 99 98 - 107 mmol/L    Carbon Dioxide (CO2) 26 22 - 29 mmol/L    Anion Gap 9 7 - 15 mmol/L    Urea Nitrogen 7.0 (L) 8.0 - 23.0 mg/dL    Creatinine 0.64 0.51 - 0.95 mg/dL    GFR Estimate 84 >60 mL/min/1.73m2      Comment:      eGFR calculated using 2021 CKD-EPI equation.    Calcium 9.0 8.8 - 10.4 mg/dL      Comment:      Reference intervals for this test were updated on 7/16/2024 to reflect our healthy population more accurately. There may be differences in the flagging of prior results with similar values performed with this method. Those prior results can be interpreted in the context of the updated reference intervals.    Glucose 98 70 - 99 mg/dL    Patient Fasting > 8hrs? Yes    TSH with free T4 reflex   Result Value Ref Range    TSH 1.75 0.30 - 4.20 uIU/mL   Lipid panel reflex to direct LDL Fasting   Result Value Ref Range    Cholesterol 191 <200 mg/dL    Triglycerides 90 <150 mg/dL    Direct Measure HDL 67 >=50 mg/dL    LDL Cholesterol Calculated 106 (H) <=100 mg/dL    Non HDL Cholesterol 124 <130 mg/dL    Patient Fasting > 8hrs? Yes     Narrative    Cholesterol  Desirable:  <200 mg/dL    Triglycerides  Normal:  Less than 150 mg/dL  Borderline High:  150-199 mg/dL  High:  200-499 mg/dL  Very High:  Greater than or equal to 500 mg/dL    Direct Measure HDL  Female:  Greater than or equal to 50 mg/dL   Male:  Greater than or equal to 40 mg/dL    LDL Cholesterol  Desirable:  <100mg/dL  Above Desirable:  100-129 mg/dL   Borderline High:  130-159 mg/dL   High:  160-189 mg/dL   Very High:  >= 190 mg/dL    Non  HDL Cholesterol  Desirable:  130 mg/dL  Above Desirable:  130-159 mg/dL  Borderline High:  160-189 mg/dL  High:  190-219 mg/dL  Very High:  Greater than or equal to 220 mg/dL       If you have any questions or concerns, please call the clinic at the number listed above.       Sincerely,      Sharda Soliman MD

## 2024-07-30 NOTE — PATIENT INSTRUCTIONS
Patient Education   Preventive Care Advice   This is general advice given by our system to help you stay healthy. However, your care team may have specific advice just for you. Please talk to your care team about your preventive care needs.  Nutrition  Eat 5 or more servings of fruits and vegetables each day.  Try wheat bread, brown rice and whole grain pasta (instead of white bread, rice, and pasta).  Get enough calcium and vitamin D. Check the label on foods and aim for 100% of the RDA (recommended daily allowance).  Lifestyle  Exercise at least 150 minutes each week  (30 minutes a day, 5 days a week).  Do muscle strengthening activities 2 days a week. These help control your weight and prevent disease.  No smoking.  Wear sunscreen to prevent skin cancer.  Have a dental exam and cleaning every 6 months.  Yearly exams  See your health care team every year to talk about:  Any changes in your health.  Any medicines your care team has prescribed.  Preventive care, family planning, and ways to prevent chronic diseases.  Shots (vaccines)   HPV shots (up to age 26), if you've never had them before.  Hepatitis B shots (up to age 59), if you've never had them before.  COVID-19 shot: Get this shot when it's due.  Flu shot: Get a flu shot every year.  Tetanus shot: Get a tetanus shot every 10 years.  Pneumococcal, hepatitis A, and RSV shots: Ask your care team if you need these based on your risk.  Shingles shot (for age 50 and up)  General health tests  Diabetes screening:  Starting at age 35, Get screened for diabetes at least every 3 years.  If you are younger than age 35, ask your care team if you should be screened for diabetes.  Cholesterol test: At age 39, start having a cholesterol test every 5 years, or more often if advised.  Bone density scan (DEXA): At age 50, ask your care team if you should have this scan for osteoporosis (brittle bones).  Hepatitis C: Get tested at least once in your life.  STIs (sexually  transmitted infections)  Before age 24: Ask your care team if you should be screened for STIs.  After age 24: Get screened for STIs if you're at risk. You are at risk for STIs (including HIV) if:  You are sexually active with more than one person.  You don't use condoms every time.  You or a partner was diagnosed with a sexually transmitted infection.  If you are at risk for HIV, ask about PrEP medicine to prevent HIV.  Get tested for HIV at least once in your life, whether you are at risk for HIV or not.  Cancer screening tests  Cervical cancer screening: If you have a cervix, begin getting regular cervical cancer screening tests starting at age 21.  Breast cancer scan (mammogram): If you've ever had breasts, begin having regular mammograms starting at age 40. This is a scan to check for breast cancer.  Colon cancer screening: It is important to start screening for colon cancer at age 45.  Have a colonoscopy test every 10 years (or more often if you're at risk) Or, ask your provider about stool tests like a FIT test every year or Cologuard test every 3 years.  To learn more about your testing options, visit:   .  For help making a decision, visit:   https://bit.ly/yk45381.  Prostate cancer screening test: If you have a prostate, ask your care team if a prostate cancer screening test (PSA) at age 55 is right for you.  Lung cancer screening: If you are a current or former smoker ages 50 to 80, ask your care team if ongoing lung cancer screenings are right for you.  For informational purposes only. Not to replace the advice of your health care provider. Copyright   2023 [facility] [employer]. All rights reserved. Clinically reviewed by the [facility]. [employer] 190811 - REV 01/24.  Hearing Loss: Care Instructions  Overview     Hearing loss is a sudden or slow decrease in how well you hear. It can range from slight to profound. Permanent hearing loss can occur with aging. It also can  happen when you are exposed long-term to loud noise. Examples include listening to loud music, riding motorcycles, or being around other loud machines.  Hearing loss can affect your work and home life. It can make you feel lonely or depressed. You may feel that you have lost your independence. But hearing aids and other devices can help you hear better and feel connected to others.  Follow-up care is a key part of your treatment and safety. Be sure to make and go to all appointments, and call your doctor if you are having problems. It's also a good idea to know your test results and keep a list of the medicines you take.  How can you care for yourself at home?  Avoid loud noises whenever possible. This helps keep your hearing from getting worse.  Always wear hearing protection around loud noises.  Wear a hearing aid as directed.  A professional can help you pick a hearing aid that will work best for you.  You can also get hearing aids over the counter for mild to moderate hearing loss.  Have hearing tests as your doctor suggests. They can show whether your hearing has changed. Your hearing aid may need to be adjusted.  Use other devices as needed. These may include:  Telephone amplifiers and hearing aids that can connect to a television, stereo, radio, or microphone.  Devices that use lights or vibrations. These alert you to the doorbell, a ringing telephone, or a baby monitor.  Television closed-captioning. This shows the words at the bottom of the screen. Most new TVs can do this.  TTY (text telephone). This lets you type messages back and forth on the telephone instead of talking or listening. These devices are also called TDD. When messages are typed on the keyboard, they are sent over the phone line to a receiving TTY. The message is shown on a monitor.  Use text messaging, social media, and email if it is hard for you to communicate by telephone.  Try to learn a listening technique called speechreading. It is  "not lipreading. You pay attention to people's gestures, expressions, posture, and tone of voice. These clues can help you understand what a person is saying. Face the person you are talking to, and have them face you. Make sure the lighting is good. You need to see the other person's face clearly.  Think about counseling if you need help to adjust to your hearing loss.  When should you call for help?  Watch closely for changes in your health, and be sure to contact your doctor if:    You think your hearing is getting worse.     You have new symptoms, such as dizziness or nausea.   Where can you learn more?  Go to https://www.[employer].net/patiented  Enter R798 in the search box to learn more about \"Hearing Loss: Care Instructions.\"  Current as of: September 27, 2023               Content Version: 14.0    0224-4398 [employer].   Care instructions adapted under license by your healthcare professional. If you have questions about a medical condition or this instruction, always ask your healthcare professional. [employer] disclaims any warranty or liability for your use of this information.      Bladder Training: Care Instructions  Your Care Instructions     Bladder training is used to treat urge incontinence and stress incontinence. Urge incontinence means that the need to urinate comes on so fast that you can't get to a toilet in time. Stress incontinence means that you leak urine because of pressure on your bladder. For example, it may happen when you laugh, cough, or lift something heavy.  Bladder training can increase how long you can wait before you have to urinate. It can also help your bladder hold more urine. And it can give you better control over the urge to urinate.  It is important to remember that bladder training takes a few weeks to a few months to make a difference. You may not see results right away, but don't give up.  Follow-up care is a key part of your treatment and " safety. Be sure to make and go to all appointments, and call your doctor if you are having problems. It's also a good idea to know your test results and keep a list of the medicines you take.  How can you care for yourself at home?  Work with your doctor to come up with a bladder training program that is right for you. You may use one or more of the following methods.  Delayed urination  In the beginning, try to keep from urinating for 5 minutes after you first feel the need to go.  While you wait, take deep, slow breaths to relax. Kegel exercises can also help you delay the need to go to the bathroom.  After some practice, when you can easily wait 5 minutes to urinate, try to wait 10 minutes before you urinate.  Slowly increase the waiting period until you are able to control when you have to urinate.  Scheduled urination  Empty your bladder when you first wake up in the morning.  Schedule times throughout the day when you will urinate.  Start by going to the bathroom every hour, even if you don't need to go.  Slowly increase the time between trips to the bathroom.  When you have found a schedule that works well for you, keep doing it.  If you wake up during the night and have to urinate, do it. Apply your schedule to waking hours only.  Kegel exercises  These tighten and strengthen pelvic muscles, which can help you control the flow of urine. (If doing these exercises causes pain, stop doing them and talk with your doctor.) To do Kegel exercises:  Squeeze your muscles as if you were trying not to pass gas. Or squeeze your muscles as if you were stopping the flow of urine. Your belly, legs, and buttocks shouldn't move.  Hold the squeeze for 3 seconds, then relax for 5 to 10 seconds.  Start with 3 seconds, then add 1 second each week until you are able to squeeze for 10 seconds.  Repeat the exercise 10 times a session. Do 3 to 8 sessions a day.  When should you call for help?  Watch closely for changes in your  "health, and be sure to contact your doctor if:    Your incontinence is getting worse.     You do not get better as expected.   Where can you learn more?  Go to https://www.[employer].net/patiented  Enter V684 in the search box to learn more about \"Bladder Training: Care Instructions.\"  Current as of: November 15, 2023               Content Version: 14.0    7986-2291 [employer].   Care instructions adapted under license by your healthcare professional. If you have questions about a medical condition or this instruction, always ask your healthcare professional. [employer] disclaims any warranty or liability for your use of this information.         "

## 2024-07-30 NOTE — PROGRESS NOTES
Preventive Care Visit  [facility]  Sharda Soliman MD, Family Medicine  Jul 30, 2024      Assessment & Plan     Routine general medical examination at a health care facility  Patient is a very pleasant 89-year-old female who presents today for annual exam.  She is largely up-to-date on screening, has also aged out of multiple screening exams.  Denies any breast concerns today.    Hyperlipidemia LDL goal <100  History of hyperlipidemia.  Had lipid check about 6 months ago, but patient is fasting today and is interested in rechecking.  This is collected.  - Lipid panel reflex to direct LDL Fasting  - Lipid panel reflex to direct LDL Fasting    Benign essential hypertension  History of high blood pressure, very well-controlled.  She brings in home blood pressure numbers.  Continue with current regimen.  - Basic metabolic panel  - Basic metabolic panel    Hypothyroidism, unspecified type  About a year ago had mildly elevated TSH, improved 6 months ago, interested in recheck which is obtained today.  - TSH with free T4 reflex  - TSH with free T4 reflex    Ascending aorta dilation (H24)  Noted to have mildly dilated ascending aorta on echocardiogram last year.  Asymptomatic.    Recurrent major depressive disorder, in full remission (H24)  Stable, PHQ is negative today.      Patient has been advised of split billing requirements and indicates understanding: Yes        Counseling  Appropriate preventive services were addressed with this patient via screening, questionnaire, or discussion as appropriate for fall prevention, nutrition, physical activity, Tobacco-use cessation, weight loss and cognition.  Checklist reviewing preventive services available has been given to the patient.  Reviewed patient's diet, addressing concerns and/or questions.   She is at risk for lack of exercise and has been provided with information to increase physical activity for the benefit of her well-being.   The  patient was instructed to see the dentist every 6 months.   The patient was provided with written information regarding signs of hearing loss.   Information on urinary incontinence and treatment options given to patient.         Subjective   Polina is a 89 year old, presenting for the following:  Wellness Visit and Hypertension        7/30/2024     1:31 PM   Additional Questions   Roomed by Anna ZHOU   Accompanied by Self        Health Care Directive  Patient does not have a Health Care Directive or Living Will: Discussed advance care planning with patient; information given to patient to review.    HPI    Hypertension Follow-up    Do you check your blood pressure regularly outside of the clinic? Yes   Are you following a low salt diet? Yes  Are your blood pressures ever more than 140 on the top number (systolic) OR more   than 90 on the bottom number (diastolic), for example 140/90? Yes          7/30/2024   General Health   How would you rate your overall physical health? Good   Feel stress (tense, anxious, or unable to sleep) To some extent      (!) STRESS CONCERN      7/30/2024   Nutrition   Diet: Regular (no restrictions)            7/30/2024   Exercise   Days per week of moderate/strenous exercise 2 days      (!) EXERCISE CONCERN      7/30/2024   Social Factors   Frequency of gathering with friends or relatives Once a week   Worry food won't last until get money to buy more No   Food not last or not have enough money for food? No   Do you have housing? (Housing is defined as stable permanent housing and does not include staying ouside in a car, in a tent, in an abandoned building, in an overnight shelter, or couch-surfing.) Yes   Are you worried about losing your housing? No   Lack of transportation? No   Unable to get utilities (heat,electricity)? No            7/30/2024   Fall Risk   Fallen 2 or more times in the past year? No   Trouble with walking or balance? No             7/30/2024   Activities of  Daily Living- Home Safety   Needs help with the following daily activites None of the above   Safety concerns in the home None of the above            7/30/2024   Dental   Dentist two times every year? (!) NO            7/30/2024   Hearing Screening   Hearing concerns? (!) I NEED TO ASK PEOPLE TO SPEAK UP OR REPEAT THEMSELVES.    (!) IT'S HARD TO FOLLOW A CONVERSATION IN A NOISY RESTAURANT OR CROWDED ROOM.    (!) TROUBLE UNDERSTANDING SOFT OR WHISPERED SPEECH.       Multiple values from one day are sorted in reverse-chronological order         7/30/2024   Driving Risk Screening   Patient/family members have concerns about driving No            7/30/2024   General Alertness/Fatigue Screening   Have you been more tired than usual lately? (!) DECLINE            7/30/2024   Urinary Incontinence Screening   Bothered by leaking urine in past 6 months Yes            7/30/2024   TB Screening   Were you born outside of the [place]? No          Today's PHQ-9 Score:       7/30/2024     1:04 PM   PHQ-9 SCORE   PHQ-9 Total Score MyChart 0   PHQ-9 Total Score 0         7/30/2024   Substance Use   Alcohol more than 3/day or more than 7/wk Not Applicable   Do you have a current opioid prescription? No   How severe/bad is pain from 1 to 10? 2/10   Do you use any other substances recreationally? No        Social History     Tobacco Use    Smoking status: Never    Smokeless tobacco: Never   Vaping Use    Vaping status: Never Used          Mammogram Screening - After age 74- determine frequency with patient based on health status, life expectancy and patient goals          Reviewed and updated as needed this visit by Provider                    Current providers sharing in care for this patient include:  Patient Care Team:  Sharda Soliman MD as PCP - General (Family Medicine)  Sharda Soliman MD as Assigned PCP    The following health maintenance items are reviewed in Epic and correct as of today:  Health Maintenance   Topic Date Due  "   RSV VACCINE (Pregnancy & 60+) (1 - 1-dose 60+ series) Never done    ZOSTER IMMUNIZATION (3 of 3) 01/21/2019    COVID-19 Vaccine (6 - 2023-24 season) 03/03/2024    INFLUENZA VACCINE (1) 09/01/2024    TSH W/FREE T4 REFLEX  01/25/2025    PHQ-9  01/30/2025    LIPID  03/14/2025    ANNUAL REVIEW OF HM ORDERS  05/28/2025    MEDICARE ANNUAL WELLNESS VISIT  07/30/2025    FALL RISK ASSESSMENT  07/30/2025    DTAP/TDAP/TD IMMUNIZATION (3 - Td or Tdap) 12/18/2025    ADVANCE CARE PLANNING  07/30/2029    DEXA  12/12/2033    DEPRESSION ACTION PLAN  Completed    Pneumococcal Vaccine: 65+ Years  Completed    IPV IMMUNIZATION  Aged Out    HPV IMMUNIZATION  Aged Out    MENINGITIS IMMUNIZATION  Aged Out    RSV MONOCLONAL ANTIBODY  Aged Out         Review of Systems  Constitutional, HEENT, cardiovascular, pulmonary, gi and gu systems are negative, except as otherwise noted.     Objective    Exam  /74 (BP Location: Right arm, Patient Position: Sitting, Cuff Size: Adult Regular)   Pulse 66   Resp 12   Ht 1.549 m (5' 1\")   Wt 54 kg (119 lb)   SpO2 96%   BMI 22.48 kg/m     Estimated body mass index is 22.48 kg/m  as calculated from the following:    Height as of this encounter: 1.549 m (5' 1\").    Weight as of this encounter: 54 kg (119 lb).    Physical Exam  GENERAL: thin, alert and no distress  EYES: Eyes grossly normal to inspection, and conjunctivae and sclerae normal  HENT: hard of hearing without hearing aids in today. ear canals and TM's normal, nose and mouth without ulcers or lesions  NECK: no adenopathy, no asymmetry, masses, or scars  RESP: lungs clear to auscultation - no rales, rhonchi or wheezes  CV: regular rate and rhythm, normal S1 S2, no S3 or S4, no murmur, click or rub, no peripheral edema  ABDOMEN: soft, nontender, no hepatosplenomegaly, no masses and bowel sounds normal  MS: no gross musculoskeletal defects noted, no edema  SKIN: no suspicious lesions or rashes  NEURO: Normal strength and tone, mentation " intact and speech normal  PSYCH: mentation appears normal, affect normal/bright         7/30/2024   Mini Cog   Clock Draw Score 2 Normal   3 Item Recall 3 objects recalled   Mini Cog Total Score 5                Signed Electronically by: Sharda Soliman MD    Answers submitted by the patient for this visit:  Patient Health Questionnaire (Submitted on 7/30/2024)  If you checked off any problems, how difficult have these problems made it for you to do your work, take care of things at home, or get along with other people?: Not difficult at all  PHQ9 TOTAL SCORE: 0

## 2024-10-09 ENCOUNTER — IMMUNIZATION (OUTPATIENT)
Dept: FAMILY MEDICINE | Facility: CLINIC | Age: 89
End: 2024-10-09
Payer: COMMERCIAL

## 2024-10-09 DIAGNOSIS — Z23 NEED FOR PROPHYLACTIC VACCINATION AND INOCULATION AGAINST INFLUENZA: Primary | ICD-10-CM

## 2024-10-09 DIAGNOSIS — Z23 HIGH PRIORITY FOR 2019-NCOV VACCINE: ICD-10-CM

## 2024-10-09 PROCEDURE — 90480 ADMN SARSCOV2 VAC 1/ONLY CMP: CPT

## 2024-10-09 PROCEDURE — 91320 SARSCV2 VAC 30MCG TRS-SUC IM: CPT

## 2024-10-09 PROCEDURE — 90471 IMMUNIZATION ADMIN: CPT

## 2024-10-09 PROCEDURE — 90662 IIV NO PRSV INCREASED AG IM: CPT

## 2024-12-26 DIAGNOSIS — E78.5 HYPERLIPIDEMIA LDL GOAL <100: ICD-10-CM

## 2024-12-26 DIAGNOSIS — F33.41 RECURRENT MAJOR DEPRESSIVE DISORDER, IN PARTIAL REMISSION (H): ICD-10-CM

## 2024-12-26 RX ORDER — LOVASTATIN 20 MG/1
20 TABLET ORAL AT BEDTIME
Qty: 90 TABLET | Refills: 0 | Status: SHIPPED | OUTPATIENT
Start: 2024-12-26

## 2024-12-26 RX ORDER — SERTRALINE HYDROCHLORIDE 100 MG/1
100 TABLET, FILM COATED ORAL DAILY
Qty: 90 TABLET | Refills: 0 | Status: SHIPPED | OUTPATIENT
Start: 2024-12-26

## 2025-01-29 DIAGNOSIS — E03.9 HYPOTHYROIDISM, UNSPECIFIED TYPE: ICD-10-CM

## 2025-01-29 RX ORDER — LEVOTHYROXINE SODIUM 88 UG/1
88 TABLET ORAL DAILY
Qty: 90 TABLET | Refills: 1 | Status: SHIPPED | OUTPATIENT
Start: 2025-01-29

## 2025-03-07 ENCOUNTER — TELEPHONE (OUTPATIENT)
Dept: FAMILY MEDICINE | Facility: CLINIC | Age: OVER 89
End: 2025-03-07

## 2025-03-07 ENCOUNTER — OFFICE VISIT (OUTPATIENT)
Dept: FAMILY MEDICINE | Facility: CLINIC | Age: OVER 89
End: 2025-03-07
Payer: COMMERCIAL

## 2025-03-07 VITALS
OXYGEN SATURATION: 97 % | RESPIRATION RATE: 18 BRPM | TEMPERATURE: 97.2 F | SYSTOLIC BLOOD PRESSURE: 164 MMHG | HEART RATE: 74 BPM | BODY MASS INDEX: 22.67 KG/M2 | WEIGHT: 120 LBS | DIASTOLIC BLOOD PRESSURE: 76 MMHG

## 2025-03-07 DIAGNOSIS — E87.1 HYPONATREMIA: Primary | ICD-10-CM

## 2025-03-07 DIAGNOSIS — R35.0 INCREASED FREQUENCY OF URINATION: ICD-10-CM

## 2025-03-07 DIAGNOSIS — E87.8 HYPOCHLOREMIA: ICD-10-CM

## 2025-03-07 DIAGNOSIS — K56.609 SBO (SMALL BOWEL OBSTRUCTION) (H): ICD-10-CM

## 2025-03-07 DIAGNOSIS — Z95.0 S/P PLACEMENT OF CARDIAC PACEMAKER: ICD-10-CM

## 2025-03-07 LAB
ALBUMIN UR-MCNC: NEGATIVE MG/DL
ANION GAP SERPL CALCULATED.3IONS-SCNC: 9 MMOL/L (ref 7–15)
APPEARANCE UR: CLEAR
BACTERIA #/AREA URNS HPF: ABNORMAL /HPF
BASOPHILS # BLD AUTO: 0 10E3/UL (ref 0–0.2)
BASOPHILS NFR BLD AUTO: 0 %
BILIRUB UR QL STRIP: NEGATIVE
BUN SERPL-MCNC: 8.7 MG/DL (ref 8–23)
CALCIUM SERPL-MCNC: 9.5 MG/DL (ref 8.8–10.4)
CHLORIDE SERPL-SCNC: 96 MMOL/L (ref 98–107)
COLOR UR AUTO: YELLOW
CREAT SERPL-MCNC: 0.71 MG/DL (ref 0.51–0.95)
EGFRCR SERPLBLD CKD-EPI 2021: 80 ML/MIN/1.73M2
EOSINOPHIL # BLD AUTO: 0.1 10E3/UL (ref 0–0.7)
EOSINOPHIL NFR BLD AUTO: 1 %
ERYTHROCYTE [DISTWIDTH] IN BLOOD BY AUTOMATED COUNT: 12.5 % (ref 10–15)
EST. AVERAGE GLUCOSE BLD GHB EST-MCNC: 103 MG/DL
GLUCOSE SERPL-MCNC: 106 MG/DL (ref 70–99)
GLUCOSE UR STRIP-MCNC: NEGATIVE MG/DL
HBA1C MFR BLD: 5.2 % (ref 0–5.6)
HCO3 SERPL-SCNC: 28 MMOL/L (ref 22–29)
HCT VFR BLD AUTO: 41.2 % (ref 35–47)
HGB BLD-MCNC: 14.1 G/DL (ref 11.7–15.7)
HGB UR QL STRIP: NEGATIVE
IMM GRANULOCYTES # BLD: 0 10E3/UL
IMM GRANULOCYTES NFR BLD: 0 %
KETONES UR STRIP-MCNC: NEGATIVE MG/DL
LEUKOCYTE ESTERASE UR QL STRIP: ABNORMAL
LYMPHOCYTES # BLD AUTO: 1.6 10E3/UL (ref 0.8–5.3)
LYMPHOCYTES NFR BLD AUTO: 22 %
MCH RBC QN AUTO: 29.3 PG (ref 26.5–33)
MCHC RBC AUTO-ENTMCNC: 34.2 G/DL (ref 31.5–36.5)
MCV RBC AUTO: 86 FL (ref 78–100)
MONOCYTES # BLD AUTO: 0.6 10E3/UL (ref 0–1.3)
MONOCYTES NFR BLD AUTO: 9 %
NEUTROPHILS # BLD AUTO: 4.8 10E3/UL (ref 1.6–8.3)
NEUTROPHILS NFR BLD AUTO: 67 %
NITRATE UR QL: NEGATIVE
PH UR STRIP: 7 [PH] (ref 5–7)
PLATELET # BLD AUTO: 218 10E3/UL (ref 150–450)
POTASSIUM SERPL-SCNC: 4.6 MMOL/L (ref 3.4–5.3)
RBC # BLD AUTO: 4.81 10E6/UL (ref 3.8–5.2)
RBC #/AREA URNS AUTO: ABNORMAL /HPF
SODIUM SERPL-SCNC: 133 MMOL/L (ref 135–145)
SP GR UR STRIP: 1.01 (ref 1–1.03)
SQUAMOUS #/AREA URNS AUTO: ABNORMAL /LPF
UROBILINOGEN UR STRIP-ACNC: 0.2 E.U./DL
WBC # BLD AUTO: 7.2 10E3/UL (ref 4–11)
WBC #/AREA URNS AUTO: ABNORMAL /HPF

## 2025-03-07 PROCEDURE — 3077F SYST BP >= 140 MM HG: CPT | Performed by: NURSE PRACTITIONER

## 2025-03-07 PROCEDURE — 85025 COMPLETE CBC W/AUTO DIFF WBC: CPT | Performed by: NURSE PRACTITIONER

## 2025-03-07 PROCEDURE — 1126F AMNT PAIN NOTED NONE PRSNT: CPT | Performed by: NURSE PRACTITIONER

## 2025-03-07 PROCEDURE — 83036 HEMOGLOBIN GLYCOSYLATED A1C: CPT | Performed by: NURSE PRACTITIONER

## 2025-03-07 PROCEDURE — 36415 COLL VENOUS BLD VENIPUNCTURE: CPT | Performed by: NURSE PRACTITIONER

## 2025-03-07 PROCEDURE — 80048 BASIC METABOLIC PNL TOTAL CA: CPT | Performed by: NURSE PRACTITIONER

## 2025-03-07 PROCEDURE — 3078F DIAST BP <80 MM HG: CPT | Performed by: NURSE PRACTITIONER

## 2025-03-07 PROCEDURE — 81001 URINALYSIS AUTO W/SCOPE: CPT | Performed by: NURSE PRACTITIONER

## 2025-03-07 PROCEDURE — 99214 OFFICE O/P EST MOD 30 MIN: CPT | Performed by: NURSE PRACTITIONER

## 2025-03-07 RX ORDER — METOPROLOL TARTRATE 25 MG/1
25 TABLET, FILM COATED ORAL 2 TIMES DAILY
Qty: 180 TABLET | Refills: 1 | Status: SHIPPED | OUTPATIENT
Start: 2025-03-07

## 2025-03-07 ASSESSMENT — PATIENT HEALTH QUESTIONNAIRE - PHQ9: SUM OF ALL RESPONSES TO PHQ QUESTIONS 1-9: 4

## 2025-03-07 ASSESSMENT — PAIN SCALES - GENERAL: PAINLEVEL_OUTOF10: NO PAIN (0)

## 2025-03-07 NOTE — LETTER
March 12, 2025      Polina Fernandez  [address] [address]        Dear ,    We are writing to inform you of your test results.    Normal red and white blood cell count   Normal platelets   Normal blood sugar control   Normal kidney function   Urinalysis is clear   No evidence of infection   Slightly low sodium level-follow-up with your primary care provider to discuss as planned     Resulted Orders   Hemoglobin A1c   Result Value Ref Range    Estimated Average Glucose 103 <117 mg/dL    Hemoglobin A1C 5.2 0.0 - 5.6 %      Comment:      Normal <5.7%   Prediabetes 5.7-6.4%    Diabetes 6.5% or higher     Note: Adopted from ADA consensus guidelines.   UA Macroscopic with reflex to Microscopic and Culture - Lab Collect   Result Value Ref Range    Color Urine Yellow Colorless, Straw, Light Yellow, Yellow    Appearance Urine Clear Clear    Glucose Urine Negative Negative mg/dL    Bilirubin Urine Negative Negative    Ketones Urine Negative Negative mg/dL    Specific Gravity Urine 1.015 1.003 - 1.035    Blood Urine Negative Negative    pH Urine 7.0 5.0 - 7.0    Protein Albumin Urine Negative Negative mg/dL    Urobilinogen Urine 0.2 0.2, 1.0 E.U./dL    Nitrite Urine Negative Negative    Leukocyte Esterase Urine Small (A) Negative   Basic metabolic panel  (Ca, Cl, CO2, Creat, Gluc, K, Na, BUN)   Result Value Ref Range    Sodium 133 (L) 135 - 145 mmol/L    Potassium 4.6 3.4 - 5.3 mmol/L    Chloride 96 (L) 98 - 107 mmol/L    Carbon Dioxide (CO2) 28 22 - 29 mmol/L    Anion Gap 9 7 - 15 mmol/L    Urea Nitrogen 8.7 8.0 - 23.0 mg/dL    Creatinine 0.71 0.51 - 0.95 mg/dL    GFR Estimate 80 >60 mL/min/1.73m2      Comment:      eGFR calculated using 2021 CKD-EPI equation.    Calcium 9.5 8.8 - 10.4 mg/dL    Glucose 106 (H) 70 - 99 mg/dL   CBC with platelets and differential   Result Value Ref Range    WBC Count 7.2 4.0 - 11.0 10e3/uL    RBC Count 4.81 3.80 - 5.20 10e6/uL    Hemoglobin 14.1 11.7 - 15.7 g/dL     Hematocrit 41.2 35.0 - 47.0 %    MCV 86 78 - 100 fL    MCH 29.3 26.5 - 33.0 pg    MCHC 34.2 31.5 - 36.5 g/dL    RDW 12.5 10.0 - 15.0 %    Platelet Count 218 150 - 450 10e3/uL    % Neutrophils 67 %    % Lymphocytes 22 %    % Monocytes 9 %    % Eosinophils 1 %    % Basophils 0 %    % Immature Granulocytes 0 %    Absolute Neutrophils 4.8 1.6 - 8.3 10e3/uL    Absolute Lymphocytes 1.6 0.8 - 5.3 10e3/uL    Absolute Monocytes 0.6 0.0 - 1.3 10e3/uL    Absolute Eosinophils 0.1 0.0 - 0.7 10e3/uL    Absolute Basophils 0.0 0.0 - 0.2 10e3/uL    Absolute Immature Granulocytes 0.0 <=0.4 10e3/uL   UA Microscopic with Reflex to Culture   Result Value Ref Range    Bacteria Urine Few (A) None Seen /HPF    RBC Urine 0-2 0-2 /HPF /HPF    WBC Urine 0-5 0-5 /HPF /HPF    Squamous Epithelials Urine Few (A) None Seen /LPF    Narrative    Urine Culture not indicated       If you have any questions or concerns, please call the clinic at the number listed above.       Sincerely,      LEORA Medina CNP    Electronically signed

## 2025-03-07 NOTE — PROGRESS NOTES
Assessment & Plan   Hospital follow up for SBO (small bowel obstruction) (H)  Improved     Hyponatremia  Follow up labs done today   - CBC with platelets and differential  - Basic metabolic panel  (Ca, Cl, CO2, Creat, Gluc, K, Na, BUN)    Increased frequency of urination  Labs to look for BS issue or infection.   - Hemoglobin A1c  - UA Macroscopic with reflex to Microscopic and Culture - Lab Collect  - UA Microscopic with Reflex to Culture    Hypochloremia  - CBC with platelets and differential  - Basic metabolic panel  (Ca, Cl, CO2, Creat, Gluc, K, Na, BUN)        HTN  S/P placement of cardiac pacemaker  Add back in   - metoprolol tartrate (LOPRESSOR) 25 MG tablet; Take 1 tablet (25 mg) by mouth 2 times daily.    Follow up appt with Primary Care Provider scheduled for next week to review    MED REC REQUIRED  Post Medication Reconciliation Status: discharge medications reconciled and changed, per note/orders    Call or return to the clinic with any worsening of symptoms or no resolution. Patient/Parent verbalized understanding and is in agreement. Medication side effects reviewed.   Current Outpatient Medications   Medication Sig Dispense Refill    B-COMPLEX-C PO Take 1 tablet by mouth every evening      Calcium Citrate 1040 MG TABS Take 500 mg by mouth      cod liver oil CAPS capsule       Coenzyme Q10 (CO Q 10) 10 MG CAPS Take 1 capsule by mouth      Krill Oil 1000 MG CAPS       l-lysine HCl 500 MG TABS tablet Take 100 mg by mouth daily      levothyroxine (SYNTHROID/LEVOTHROID) 88 MCG tablet TAKE ONE TABLET BY MOUTH ONCE DAILY 90 tablet 1    losartan (COZAAR) 50 MG tablet Take 1 tablet (50 mg) by mouth daily 90 tablet 3    Lutein 6 MG CAPS       metoprolol tartrate (LOPRESSOR) 25 MG tablet Take 1 tablet (25 mg) by mouth 2 times daily. 180 tablet 1    potassium aminobenzoate 500 MG CAPS capsule       Selenium 100 MCG TABS Take 200 mg by mouth      sertraline (ZOLOFT) 100 MG tablet TAKE ONE TABLET BY MOUTH ONCE  DAILY 90 tablet 0    timolol maleate (TIMOPTIC) 0.5 % ophthalmic solution       VITAMIN C, CALCIUM ASCORBATE, PO Chew and swallow 1 tablet daily.      zinc gluconate 50 MG tablet Take 50 mg by mouth daily      lovastatin (MEVACOR) 20 MG tablet TAKE 1 TABLET (20 MG) BY MOUTH AT BEDTIME 90 tablet 0     Chart documentation with Dragon Voice recognition Software. Although reviewed after completion, some words and grammatical errors may remain.  Whitley Vázquez MSN,FNP-[place] [id number]  [address] [place] [id number]  [phone]        See Patient Instructions    Subjective   Polina is a 89 year old, presenting for the following health issues:  ER F/U        3/7/2025    11:47 AM   Additional Questions   Roomed by enrrique   Accompanied by self     HPI          ED/UC Followup:    Facility:  [facility]   Date of visit: 2/28/2025  Reason for visit: abdominal pain, SBO   Current Status: abdominal pain has improved just is very fatigue   Checking her blood pressure at home and she is having high readings over 140/90         SBO (small bowel obstruction) (HC)    Hospital Problem List   Principal Problem:  SBO (small bowel obstruction) (HC)  Active Problems:  Hypercholesterolemia  S/P dual chamber permanent pacemaker implantation on 03/25/2020  Essential hypertension  Major depressive disorder, 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 Monitor and treat accordingly.   Hypochloremia: Last Cl 94 (2/28/2025). Monitor and treat accordingly.   ED Labs reviewed and pending labs done today   Does not think she has been taking her metoprolol      ROS: 10 point ROS neg other than the symptoms noted above in the HPI.        Objective    BP (!) 164/76   Pulse 74   Temp 97.2  F (36.2  C) (Tympanic)   Resp 18   Wt 54.4 kg (120 lb)   SpO2 97%   BMI 22.67 kg/m    Body mass index  is 22.67 kg/m .  Physical Exam   GENERAL: alert and fatigued  EYES: Eyes grossly normal to inspection, PERRL and conjunctivae and sclerae normal  HENT: ear canals and TM's normal, nose and mouth without ulcers or lesions  NECK: no adenopathy, no asymmetry, masses, or scars  RESP: lungs clear to auscultation - no rales, rhonchi or wheezes  CV pacmaker left anterior chest normal rhythm - no peripheral edema  ABDOMEN: soft, nontender, no hepatosplenomegaly, no masses and bowel sounds normal. No abdominal pain today   MS: no gross musculoskeletal defects noted, mild LE edema  SKIN: no suspicious lesions or rashes  NEURO: Normal strength and tone, mentation intact and speech normal  PSYCH: mentation appears normal, affect normal/bright    Office Visit on 07/30/2024   Component Date Value Ref Range Status    Sodium 07/30/2024 134 (L)  135 - 145 mmol/L Final    Potassium 07/30/2024 4.2  3.4 - 5.3 mmol/L Final    Chloride 07/30/2024 99  98 - 107 mmol/L Final    Carbon Dioxide (CO2) 07/30/2024 26  22 - 29 mmol/L Final    Anion Gap 07/30/2024 9  7 - 15 mmol/L Final    Urea Nitrogen 07/30/2024 7.0 (L)  8.0 - 23.0 mg/dL Final    Creatinine 07/30/2024 0.64  0.51 - 0.95 mg/dL Final    GFR Estimate 07/30/2024 84  >60 mL/min/1.73m2 Final    eGFR calculated using 2021 CKD-EPI equation.    Calcium 07/30/2024 9.0  8.8 - 10.4 mg/dL Final    Reference intervals for this test were updated on 7/16/2024 to reflect our healthy population more accurately. There may be differences in the flagging of prior results with similar values performed with this method. Those prior results can be interpreted in the context of the updated reference intervals.    Glucose 07/30/2024 98  70 - 99 mg/dL Final    Patient Fasting > 8hrs? 07/30/2024 Yes   Final    TSH 07/30/2024 1.75  0.30 - 4.20 uIU/mL Final    Cholesterol 07/30/2024 191  <200 mg/dL Final    Triglycerides 07/30/2024 90  <150 mg/dL Final    Direct Measure HDL 07/30/2024 67  >=50 mg/dL Final     LDL Cholesterol Calculated 07/30/2024 106 (H)  <=100 mg/dL Final    Non HDL Cholesterol 07/30/2024 124  <130 mg/dL Final    Patient Fasting > 8hrs? 07/30/2024 Yes   Final           Signed Electronically by: LEORA Medina CNP

## 2025-03-07 NOTE — PATIENT INSTRUCTIONS
Thank you for coming to see me today! Here are a couple of pieces of information about messages and lab communication practices.     If lab work was done today as part of your evaluation you will generally be contacted via [facility], mail, or phone with the results within 7-10 days.  If there is an alarming/concerning result we will contact you by phone. Lab results come back at varying times, I generally wait until all labs are resulted before making comments on results. Please note, labs are automatically released to [facility] once available, but it may take a couple of days for my interpretation note to appear.     I try very hard to respond to medical messages with 2 business days of receiving them. Occasionally it takes me longer if I am trying to figure out the best way to respond and need to seek guidance, do some research or dig deeper into your medical history to come up with a helpful response.     If you need refills please contact your pharmacist. They will send a refill request to me to review. Please allow 3-5 business days for us to respond to all refill requests.     Please call or send a medical message with any questions or concerns. Thank you for trusting me to be part of your healthcare team!

## 2025-03-16 ENCOUNTER — TELEPHONE (OUTPATIENT)
Dept: FAMILY MEDICINE | Facility: CLINIC | Age: OVER 89
End: 2025-03-16
Payer: COMMERCIAL

## 2025-03-16 ENCOUNTER — NURSE TRIAGE (OUTPATIENT)
Dept: NURSING | Facility: CLINIC | Age: OVER 89
End: 2025-03-16
Payer: COMMERCIAL

## 2025-03-16 NOTE — TELEPHONE ENCOUNTER
Reason for Call:  Appointment Request    Patient requesting this type of appt:  Same day    Requested provider:  First available    Reason patient unable to be scheduled: Not within requested timeframe    When does patient want to be seen/preferred time: Same day    Comments: PT has a bruise on her arm and was told by nurse triage line to be seen in 1-2 days but nothing was showing as available. Please reach out to PT if she can be seen 3/17/25 or 3/18/25.    Okay to leave a detailed message?: Yes at Cell number on file:    Telephone Information:   Mobile [phone]       Call taken on 3/16/2025 at 1:08 PM by Shikha Gardner

## 2025-03-16 NOTE — TELEPHONE ENCOUNTER
Woke up with big bruise on left arm. It's four inches long and 2.5 inches wide. Pea sized bump in it. I connected with scheduling for an appointment within the next three days. She has to secure a ride too.    Jessica Thibodeaux RN  [facility]    Reason for Disposition   [1] Not caused by an injury AND [2] < 5 unexplained bruises    Additional Information   Negative: Shock suspected (e.g., cold/pale/clammy skin, too weak to stand, low BP, rapid pulse)   Negative: Sounds like a life-threatening emergency to the triager   Negative: Bruises with fever   Negative: Tiny bruises (spots or dots) of unknown cause   Negative: Bruise(s) of forehead or head   Negative: Bruise(s) of face or jaw   Negative: Followed an injury, and triager doesn't know which injury guideline to use first   Negative: Post-operative bruising   Negative: Dizziness or lightheadedness   Negative: [1] Bruise on head, face, chest, or abdomen AND [2] taking Coumadin (warfarin) or other strong blood thinner, or known bleeding disorder (e.g., thrombocytopenia)   Negative: Unexplained bleeding from another site (e.g., gums, nose, urine) as well   Negative: Patient sounds very sick or weak to the triager   Negative: [1] Not caused by an injury AND [2] 5 or more bruises now   Negative: [1] Raised bruise AND [2] size > 2 inches (5 cm) AND [3] getting bigger   Negative: [1] SEVERE pain AND [2] not improved 2 hours after pain medicine/ice packs   Negative: Suspicious history for the injury   Negative: Taking Coumadin (warfarin) or other strong blood thinner, or known bleeding disorder (e.g., thrombocytopenia)  (Exception: Very small, painless bruise at heparin injection site.)    Protocols used: Bruises-A-AH

## 2025-03-17 NOTE — TELEPHONE ENCOUNTER
Called no answer, left message. Requested a call back to schedule appt.    Mariel Aguero/ Patient

## 2025-03-19 ENCOUNTER — OFFICE VISIT (OUTPATIENT)
Dept: FAMILY MEDICINE | Facility: CLINIC | Age: OVER 89
End: 2025-03-19
Payer: COMMERCIAL

## 2025-03-19 VITALS
TEMPERATURE: 97 F | DIASTOLIC BLOOD PRESSURE: 80 MMHG | SYSTOLIC BLOOD PRESSURE: 158 MMHG | OXYGEN SATURATION: 97 % | HEART RATE: 66 BPM | BODY MASS INDEX: 23.26 KG/M2 | WEIGHT: 123.2 LBS | RESPIRATION RATE: 16 BRPM | HEIGHT: 61 IN

## 2025-03-19 DIAGNOSIS — R94.31 ACUTE ELECTROCARDIOGRAM CHANGES: ICD-10-CM

## 2025-03-19 DIAGNOSIS — R07.89 ATYPICAL CHEST PAIN: ICD-10-CM

## 2025-03-19 DIAGNOSIS — I10 BENIGN ESSENTIAL HYPERTENSION: ICD-10-CM

## 2025-03-19 DIAGNOSIS — E87.1 HYPONATREMIA: ICD-10-CM

## 2025-03-19 DIAGNOSIS — T14.8XXA HEMATOMA OF SKIN: Primary | ICD-10-CM

## 2025-03-19 DIAGNOSIS — T14.8XXA HEMATOMA OF SKIN: ICD-10-CM

## 2025-03-19 DIAGNOSIS — R07.89 ATYPICAL CHEST PAIN: Primary | ICD-10-CM

## 2025-03-19 PROCEDURE — 1126F AMNT PAIN NOTED NONE PRSNT: CPT | Performed by: NURSE PRACTITIONER

## 2025-03-19 PROCEDURE — 3079F DIAST BP 80-89 MM HG: CPT | Performed by: NURSE PRACTITIONER

## 2025-03-19 PROCEDURE — 99214 OFFICE O/P EST MOD 30 MIN: CPT | Performed by: NURSE PRACTITIONER

## 2025-03-19 PROCEDURE — 3077F SYST BP >= 140 MM HG: CPT | Performed by: NURSE PRACTITIONER

## 2025-03-19 PROCEDURE — 93000 ELECTROCARDIOGRAM COMPLETE: CPT | Performed by: NURSE PRACTITIONER

## 2025-03-19 ASSESSMENT — PAIN SCALES - GENERAL: PAINLEVEL_OUTOF10: NO PAIN (0)

## 2025-03-19 NOTE — PROGRESS NOTES
"  {PROVIDER CHARTING PREFERENCE:490901}    Subjective   Polina is a 89 year old, presenting for the following health issues:  Derm Problem (Bruises )        3/19/2025     2:49 PM   Additional Questions   Roomed by Torri JERRY([facility])     History of Present Illness       Reason for visit:  Brusing on arm    She eats 4 or more servings of fruits and vegetables daily.She consumes 0 sweetened beverage(s) daily.She exercises with enough effort to increase her heart rate 9 or less minutes per day.  She exercises with enough effort to increase her heart rate 3 or less days per week.   She is taking medications regularly.        Pt was triaged on 3/16/25 here are the notes from the RN: \"Woke up with big bruise on left arm. It's four inches long and 2.5 inches wide. Pea sized bump in it. I connected with scheduling for an appointment within the next three days. She has to secure a ride too. \"    PT verbalized no pain on the arm     Friday evening caught a box ***      CHEST PAIN   Onset: Yesterday for the 1st time - feel pain on the right side of the chest all day - nothing today   Description:   Location:  right side  Character: dull   Radiation: no  Duration: all day    Intensity: mild, 2/10  Progression of Symptoms:  improving and intermittent  Accompanying Signs & Symptoms:  Shortness of breath: no  Sweating: no  Nausea/vomiting: no  Lightheadedness: no  Palpitations: No  Fever/Chills: no  Cough: no  Heartburn: no  History:   Family history of heart disease YES- Sister had some heart attack or stroke -  in minutes pt is not sure which one -   Tobacco use: no  Precipitating factors:   Worse with exertion: no   Worse with deep breaths :  YES  Related to food: no   Alleviating factors:  na     Therapies Tried and outcome: na       Just with deep breaths   Is anxious on the daily, not more than normal she thinks  No spicy, acidy foods   Has been feeling healthy otherwise   No swelling noted  Just added back in Metoprolol 2 " weeks ago, only doing 1 in the morning           {ROS Picklists (Optional):768432}      Objective    There were no vitals taken for this visit.  There is no height or weight on file to calculate BMI.  Physical Exam   {Exam List (Optional):633199}    {Diagnostic Test Results (Optional):698203}        Signed Electronically by: Naya Cabello DNP  {Email feedback regarding this note to [email or web address]   :[id number]}   file to calculate BMI.  Physical Exam   GENERAL: alert and no distress  NECK: no adenopathy, no asymmetry, masses, or scars  RESP: lungs clear to auscultation - no rales, rhonchi or wheezes  CV: regular rate and rhythm, normal S1 S2, no S3 or S4, no murmur, click or rub, no peripheral edema  ABDOMEN: soft, nontender, no hepatosplenomegaly, no masses and bowel sounds normal  MS: no gross musculoskeletal defects noted, no edema  SKIN: Large bruise on left inner forearm  NEURO: Normal strength and tone, mentation intact and speech normal  PSYCH: mentation appears normal, affect normal/bright    Diagnostic Test Results:  Pending  EKG - appears normal, NSR, normal axis, normal intervals, no acute ST/T changes c/w ischemia, Left Bundle Branch Block         Signed Electronically by: Naya Cabello, CECELIA      Chart documentation with Dragon Voice recognition Software. Although reviewed after completion, some words and grammatical errors may remain.

## 2025-03-19 NOTE — NURSING NOTE
"Chief Complaint   Patient presents with    Derm Problem     Bruises        Initial There were no vitals taken for this visit. Estimated body mass index is 22.67 kg/m  as calculated from the following:    Height as of 7/30/24: 1.549 m (5' 1\").    Weight as of 3/7/25: 54.4 kg (120 lb).    Patient presents to the clinic using No DME    Is there anyone who you would like to be able to receive your results? No  If yes have patient fill out TAWNY      "

## 2025-03-19 NOTE — PATIENT INSTRUCTIONS
Hematoma on left arm likely secondary to the trauma from the box on Friday.  Lump likely from the broken blood vessel.  Continue to monitor, can warm pack and should start to improve.  Bruising may take several weeks to improve.    Watch for any redness, swelling or tenderness around bruise and be seen if having any of the symptoms.    Will check blood count and platelets and notify you of results    For atypical pain yesterday in the chest, EKG obtained today showing abnormal and changes from previous. Will need you to be evaluated further at the ER.     For blood pressure, start taking your evening dose of metoprolol (should be taken twice daily).    Schedule a visit with Dr. Soliman in 2 weeks for recheck.

## 2025-03-20 ENCOUNTER — TELEPHONE (OUTPATIENT)
Dept: FAMILY MEDICINE | Facility: CLINIC | Age: OVER 89
End: 2025-03-20
Payer: COMMERCIAL

## 2025-03-20 NOTE — TELEPHONE ENCOUNTER
Reason for Call:  Appointment Request    Patient requesting this type of appt:  Hospital/ED Follow-Up     Requested provider: Sharda Soliman    Reason patient unable to be scheduled: Not within requested timeframe    When does patient want to be seen/preferred time: 3-7 days    Comments: Hospital follow up. Discharged from [facility] on 03/20/25. Heart problems.     Okay to leave a detailed message?: Yes at Home number on file [phone] (home)    Call taken on 3/20/2025 at 2:19 PM by Lorene Pablo

## 2025-03-24 ENCOUNTER — OFFICE VISIT (OUTPATIENT)
Dept: FAMILY MEDICINE | Facility: CLINIC | Age: OVER 89
End: 2025-03-24
Payer: COMMERCIAL

## 2025-03-24 VITALS
DIASTOLIC BLOOD PRESSURE: 82 MMHG | SYSTOLIC BLOOD PRESSURE: 158 MMHG | RESPIRATION RATE: 16 BRPM | OXYGEN SATURATION: 95 % | HEIGHT: 61 IN | BODY MASS INDEX: 22.84 KG/M2 | HEART RATE: 68 BPM | WEIGHT: 121 LBS

## 2025-03-24 DIAGNOSIS — L81.9 HYPERPIGMENTED SKIN LESION: Primary | ICD-10-CM

## 2025-03-24 DIAGNOSIS — F41.8 DEPRESSION WITH ANXIETY: ICD-10-CM

## 2025-03-24 DIAGNOSIS — I10 BENIGN ESSENTIAL HYPERTENSION: ICD-10-CM

## 2025-03-24 PROCEDURE — 99214 OFFICE O/P EST MOD 30 MIN: CPT | Performed by: STUDENT IN AN ORGANIZED HEALTH CARE EDUCATION/TRAINING PROGRAM

## 2025-03-24 PROCEDURE — 3077F SYST BP >= 140 MM HG: CPT | Performed by: STUDENT IN AN ORGANIZED HEALTH CARE EDUCATION/TRAINING PROGRAM

## 2025-03-24 PROCEDURE — 1126F AMNT PAIN NOTED NONE PRSNT: CPT | Performed by: STUDENT IN AN ORGANIZED HEALTH CARE EDUCATION/TRAINING PROGRAM

## 2025-03-24 PROCEDURE — 3079F DIAST BP 80-89 MM HG: CPT | Performed by: STUDENT IN AN ORGANIZED HEALTH CARE EDUCATION/TRAINING PROGRAM

## 2025-03-24 RX ORDER — DIAPER,BRIEF,INFANT-TODD,DISP
EACH MISCELLANEOUS 2 TIMES DAILY
Qty: 30 G | Refills: 0 | Status: SHIPPED | OUTPATIENT
Start: 2025-03-24

## 2025-03-24 ASSESSMENT — PAIN SCALES - GENERAL: PAINLEVEL_OUTOF10: NO PAIN (0)

## 2025-03-24 NOTE — PROGRESS NOTES
Assessment & Plan     Patient is a pleasant 89 year old who presents today for follow up from ED visit last week for hypertension and chest pain. Also discussed skin lesion and briefly mental health.     Hyperpigmented skin lesion  For skin lesion, see physical exam or media tab for photo. Given her age, changing symptoms, will have her see derm to discuss biopsy options. For itching, can trial hydrocortisone in the meantime for symptomatic relief.   - Adult Dermatology  Referral  - hydrocortisone (CORTAID) 0.5 % external cream  Dispense: 30 g; Refill: 0    Benign essential hypertension  For htn, BP remains elevated here on recheck, but she states it is better at home. Will have her check home numbers over the next 2 weeks and consider increasing losartan if remains >150/90. Given age, risk of falls higher if we push BP lower than that, though in an ideal world, if under 140/90, even better. Does have chronic hyponatremia, so avoid thiazides if possible.     Depression with anxiety  For mental health, she reports independently doubling her sertraline. She is on max dose 200 mg daily currently. Consider hyponatremia may be in part due to sertraline dose. Recommended we cut back, but she would not like to do so today.     MED REC REQUIRED  Post Medication Reconciliation Status:  Patient was not discharged from an inpatient facility or TCU      I spent a total of 30 minutes on the day of the visit.   Time spent by me today doing chart review, history and exam, documentation and further activities per the note    Subjective   Polina is a 89 year old, presenting for the following health issues:  ER F/U and Skin Spots (Left side of face, itches)        3/24/2025     2:24 PM   Additional Questions   Roomed by Anna ZHOU   Accompanied by Self     [facility]      ED/ Followup:    Facility:  [facility]  Date of visit: 03/19/2025  Reason for visit: EKG, blood pressure  Current Status: same    Tired - started about 6  "months ago.   This was not around when she was taking sertraline BID. She thinks she has been taking it at BID dosing longer than that   Chest pain? Not really    Had stopped BP meds for a while.   Taking both BP meds now.     \"Anxious all the time\"   Son she lives with has a lot of health problems. He doesn't get along with his siblings.   This causes a lot of stress for her.     Taking BID sertraline.     \"When I take it the second time, it might be down to 138 or something like that\"   Didn't bring any numbers with her today.       Increased itching lesion left preauricular. Not bleeding at all.   Has put some aloe vera on it once in a while.         3/14/2024     3:56 PM 7/30/2024     1:04 PM 3/7/2025    11:49 AM   PHQ   PHQ-9 Total Score 0 0 4   Q9: Thoughts of better off dead/self-harm past 2 weeks Not at all Not at all Not at all         7/27/2018     2:40 PM 7/25/2023     1:21 PM 3/14/2024     3:58 PM   KEATON-7 SCORE   Total Score  1 (minimal anxiety) 1 (minimal anxiety)   Total Score 4 1 1       Review of Systems  Constitutional, HEENT, cardiovascular, pulmonary, gi and gu systems are negative, except as otherwise noted.      Objective    BP (!) 158/82 (BP Location: Right arm, Patient Position: Sitting, Cuff Size: Adult Regular)   Pulse 68   Resp 16   Ht 1.54 m (5' 0.63\")   Wt 54.9 kg (121 lb)   SpO2 95%   BMI 23.14 kg/m    Body mass index is 23.14 kg/m .  Physical Exam  Constitutional:       Appearance: Normal appearance.   HENT:      Head: Normocephalic.   Eyes:      General: No scleral icterus.     Extraocular Movements: Extraocular movements intact.      Conjunctiva/sclera: Conjunctivae normal.   Cardiovascular:      Rate and Rhythm: Normal rate.   Pulmonary:      Effort: Pulmonary effort is normal.   Neurological:      General: No focal deficit present.      Mental Status: She is alert and oriented to person, place, and time.                    Signed Electronically by: Sharda Soliman MD    "

## 2025-03-24 NOTE — PATIENT INSTRUCTIONS
[facility]  [facility]   [email or web address]      [facility]  [place]  [facility]  [place]  [facility] ([facility])   [email or web address]        Check blood pressure daily for the next few weeks, then I'll have someone call you to get some numbers.

## 2025-03-27 DIAGNOSIS — F33.41 RECURRENT MAJOR DEPRESSIVE DISORDER, IN PARTIAL REMISSION: ICD-10-CM

## 2025-03-27 DIAGNOSIS — E78.5 HYPERLIPIDEMIA LDL GOAL <100: ICD-10-CM

## 2025-03-27 RX ORDER — SERTRALINE HYDROCHLORIDE 100 MG/1
100 TABLET, FILM COATED ORAL DAILY
Qty: 90 TABLET | Refills: 1 | Status: SHIPPED | OUTPATIENT
Start: 2025-03-27

## 2025-03-27 RX ORDER — LOVASTATIN 20 MG/1
20 TABLET ORAL AT BEDTIME
Qty: 90 TABLET | Refills: 1 | Status: SHIPPED | OUTPATIENT
Start: 2025-03-27

## 2025-04-07 ENCOUNTER — TELEPHONE (OUTPATIENT)
Dept: FAMILY MEDICINE | Facility: CLINIC | Age: OVER 89
End: 2025-04-07
Payer: COMMERCIAL

## 2025-04-07 NOTE — TELEPHONE ENCOUNTER
Called patient, left a detailed message to call back with BP numbers.    Mariel Aguero/ Patient

## 2025-04-08 NOTE — TELEPHONE ENCOUNTER
Let patient know that because most pressures are in the 140's or lower, continue to monitor intermittently (couple times a week) and if she's getting more in the 150's then we'll consider medication.     Sharda Soliman MD

## 2025-05-04 DIAGNOSIS — I10 BENIGN ESSENTIAL HYPERTENSION: ICD-10-CM

## 2025-05-05 RX ORDER — LOSARTAN POTASSIUM 50 MG/1
50 TABLET ORAL
Qty: 90 TABLET | Refills: 2 | Status: SHIPPED | OUTPATIENT
Start: 2025-05-05

## 2025-05-05 NOTE — TELEPHONE ENCOUNTER
Pending Prescriptions:                       Disp   Refills    losartan (COZAAR) 50 MG tablet [Pharmacy M*90 tab*2        Sig: TAKE 1 TABLET (50 MG) BY MOUTH DAILY    Routing refill request to provider for review/approval because:  BP not in goal range    BP Readings from Last 3 Encounters:   03/24/25 (!) 158/82   03/19/25 (!) 158/80   03/07/25 (!) 164/76

## 2025-07-07 ENCOUNTER — PATIENT OUTREACH (OUTPATIENT)
Dept: CARE COORDINATION | Facility: CLINIC | Age: OVER 89
End: 2025-07-07
Payer: COMMERCIAL

## 2025-07-22 DIAGNOSIS — E03.9 HYPOTHYROIDISM, UNSPECIFIED TYPE: ICD-10-CM

## 2025-07-22 RX ORDER — LEVOTHYROXINE SODIUM 88 UG/1
88 TABLET ORAL DAILY
Qty: 90 TABLET | Refills: 0 | Status: SHIPPED | OUTPATIENT
Start: 2025-07-22

## 2025-08-04 ENCOUNTER — NURSE TRIAGE (OUTPATIENT)
Dept: FAMILY MEDICINE | Facility: CLINIC | Age: OVER 89
End: 2025-08-04
Payer: COMMERCIAL

## 2025-08-05 ENCOUNTER — OFFICE VISIT (OUTPATIENT)
Dept: FAMILY MEDICINE | Facility: CLINIC | Age: OVER 89
End: 2025-08-05
Payer: COMMERCIAL

## 2025-08-05 VITALS
RESPIRATION RATE: 14 BRPM | HEART RATE: 97 BPM | WEIGHT: 126 LBS | OXYGEN SATURATION: 99 % | BODY MASS INDEX: 24.74 KG/M2 | TEMPERATURE: 97 F | HEIGHT: 60 IN | DIASTOLIC BLOOD PRESSURE: 80 MMHG | SYSTOLIC BLOOD PRESSURE: 161 MMHG

## 2025-08-05 DIAGNOSIS — E03.9 HYPOTHYROIDISM, UNSPECIFIED TYPE: ICD-10-CM

## 2025-08-05 DIAGNOSIS — I10 BENIGN ESSENTIAL HYPERTENSION: ICD-10-CM

## 2025-08-05 DIAGNOSIS — N61.0 CELLULITIS OF RIGHT BREAST: ICD-10-CM

## 2025-08-05 DIAGNOSIS — Z95.0 S/P PLACEMENT OF CARDIAC PACEMAKER: ICD-10-CM

## 2025-08-05 DIAGNOSIS — N64.4 BREAST PAIN, RIGHT: Primary | ICD-10-CM

## 2025-08-05 DIAGNOSIS — E78.5 HYPERLIPIDEMIA LDL GOAL <100: ICD-10-CM

## 2025-08-05 DIAGNOSIS — E87.1 HYPONATREMIA: ICD-10-CM

## 2025-08-05 DIAGNOSIS — F33.41 RECURRENT MAJOR DEPRESSIVE DISORDER, IN PARTIAL REMISSION: ICD-10-CM

## 2025-08-05 LAB
ANION GAP SERPL CALCULATED.3IONS-SCNC: 9 MMOL/L (ref 7–15)
AST SERPL W P-5'-P-CCNC: 25 U/L (ref 0–45)
BASOPHILS # BLD AUTO: 0 10E3/UL (ref 0–0.2)
BASOPHILS NFR BLD AUTO: 0 %
BUN SERPL-MCNC: 10 MG/DL (ref 8–23)
CALCIUM SERPL-MCNC: 9 MG/DL (ref 8.8–10.4)
CHLORIDE SERPL-SCNC: 97 MMOL/L (ref 98–107)
CHOLEST SERPL-MCNC: 182 MG/DL
CREAT SERPL-MCNC: 0.72 MG/DL (ref 0.51–0.95)
EGFRCR SERPLBLD CKD-EPI 2021: 79 ML/MIN/1.73M2
EOSINOPHIL # BLD AUTO: 0.1 10E3/UL (ref 0–0.7)
EOSINOPHIL NFR BLD AUTO: 2 %
ERYTHROCYTE [DISTWIDTH] IN BLOOD BY AUTOMATED COUNT: 12.5 % (ref 10–15)
FASTING STATUS PATIENT QL REPORTED: YES
FASTING STATUS PATIENT QL REPORTED: YES
GLUCOSE SERPL-MCNC: 92 MG/DL (ref 70–99)
HCO3 SERPL-SCNC: 27 MMOL/L (ref 22–29)
HCT VFR BLD AUTO: 38.2 % (ref 35–47)
HDLC SERPL-MCNC: 68 MG/DL
HGB BLD-MCNC: 12.9 G/DL (ref 11.7–15.7)
IMM GRANULOCYTES # BLD: 0 10E3/UL
IMM GRANULOCYTES NFR BLD: 0 %
LDLC SERPL CALC-MCNC: 101 MG/DL
LYMPHOCYTES # BLD AUTO: 1.4 10E3/UL (ref 0.8–5.3)
LYMPHOCYTES NFR BLD AUTO: 18 %
MCH RBC QN AUTO: 29.3 PG (ref 26.5–33)
MCHC RBC AUTO-ENTMCNC: 33.8 G/DL (ref 31.5–36.5)
MCV RBC AUTO: 87 FL (ref 78–100)
MONOCYTES # BLD AUTO: 0.8 10E3/UL (ref 0–1.3)
MONOCYTES NFR BLD AUTO: 11 %
NEUTROPHILS # BLD AUTO: 5.3 10E3/UL (ref 1.6–8.3)
NEUTROPHILS NFR BLD AUTO: 70 %
NONHDLC SERPL-MCNC: 114 MG/DL
PLATELET # BLD AUTO: 184 10E3/UL (ref 150–450)
POTASSIUM SERPL-SCNC: 4.5 MMOL/L (ref 3.4–5.3)
RBC # BLD AUTO: 4.41 10E6/UL (ref 3.8–5.2)
SODIUM SERPL-SCNC: 133 MMOL/L (ref 135–145)
TRIGL SERPL-MCNC: 63 MG/DL
TSH SERPL DL<=0.005 MIU/L-ACNC: 3.6 UIU/ML (ref 0.3–4.2)
WBC # BLD AUTO: 7.6 10E3/UL (ref 4–11)

## 2025-08-05 PROCEDURE — 3079F DIAST BP 80-89 MM HG: CPT | Performed by: NURSE PRACTITIONER

## 2025-08-05 PROCEDURE — 84443 ASSAY THYROID STIM HORMONE: CPT | Performed by: NURSE PRACTITIONER

## 2025-08-05 PROCEDURE — 3077F SYST BP >= 140 MM HG: CPT | Performed by: NURSE PRACTITIONER

## 2025-08-05 PROCEDURE — 85025 COMPLETE CBC W/AUTO DIFF WBC: CPT | Performed by: NURSE PRACTITIONER

## 2025-08-05 PROCEDURE — 99214 OFFICE O/P EST MOD 30 MIN: CPT | Performed by: NURSE PRACTITIONER

## 2025-08-05 PROCEDURE — 80061 LIPID PANEL: CPT | Performed by: NURSE PRACTITIONER

## 2025-08-05 PROCEDURE — 36415 COLL VENOUS BLD VENIPUNCTURE: CPT | Performed by: NURSE PRACTITIONER

## 2025-08-05 PROCEDURE — 80048 BASIC METABOLIC PNL TOTAL CA: CPT | Performed by: NURSE PRACTITIONER

## 2025-08-05 PROCEDURE — 1126F AMNT PAIN NOTED NONE PRSNT: CPT | Performed by: NURSE PRACTITIONER

## 2025-08-05 PROCEDURE — 84450 TRANSFERASE (AST) (SGOT): CPT | Performed by: NURSE PRACTITIONER

## 2025-08-05 RX ORDER — LOSARTAN POTASSIUM 50 MG/1
50 TABLET ORAL
Qty: 90 TABLET | Refills: 0 | Status: SHIPPED | OUTPATIENT
Start: 2025-08-05

## 2025-08-05 RX ORDER — LEVOTHYROXINE SODIUM 88 UG/1
88 TABLET ORAL DAILY
Qty: 90 TABLET | Refills: 0 | Status: SHIPPED | OUTPATIENT
Start: 2025-08-05

## 2025-08-05 RX ORDER — METOPROLOL TARTRATE 25 MG/1
25 TABLET, FILM COATED ORAL 2 TIMES DAILY
Qty: 180 TABLET | Refills: 0 | Status: SHIPPED | OUTPATIENT
Start: 2025-08-05

## 2025-08-05 RX ORDER — SERTRALINE HYDROCHLORIDE 100 MG/1
100 TABLET, FILM COATED ORAL DAILY
Qty: 90 TABLET | Refills: 1 | Status: CANCELLED | OUTPATIENT
Start: 2025-08-05

## 2025-08-05 RX ORDER — LOVASTATIN 20 MG/1
20 TABLET ORAL AT BEDTIME
Qty: 90 TABLET | Refills: 0 | Status: SHIPPED | OUTPATIENT
Start: 2025-08-05

## 2025-08-05 RX ORDER — CEPHALEXIN 500 MG/1
500 CAPSULE ORAL 4 TIMES DAILY
Qty: 28 CAPSULE | Refills: 0 | Status: SHIPPED | OUTPATIENT
Start: 2025-08-05 | End: 2025-08-07

## 2025-08-05 RX ORDER — SERTRALINE HYDROCHLORIDE 100 MG/1
100 TABLET, FILM COATED ORAL DAILY
Qty: 90 TABLET | Refills: 0 | Status: SHIPPED | OUTPATIENT
Start: 2025-08-05

## 2025-08-05 ASSESSMENT — PAIN SCALES - GENERAL: PAINLEVEL_OUTOF10: NO PAIN (0)

## 2025-08-06 ENCOUNTER — RESULTS FOLLOW-UP (OUTPATIENT)
Dept: FAMILY MEDICINE | Facility: CLINIC | Age: OVER 89
End: 2025-08-06
Payer: COMMERCIAL

## 2025-08-07 ENCOUNTER — TELEPHONE (OUTPATIENT)
Dept: FAMILY MEDICINE | Facility: CLINIC | Age: OVER 89
End: 2025-08-07
Payer: COMMERCIAL

## 2025-08-07 DIAGNOSIS — N61.0 CELLULITIS OF RIGHT BREAST: ICD-10-CM

## 2025-08-07 RX ORDER — CEPHALEXIN 500 MG/1
500 CAPSULE ORAL 4 TIMES DAILY
Qty: 28 CAPSULE | Refills: 0 | Status: SHIPPED | OUTPATIENT
Start: 2025-08-07

## 2025-08-12 ENCOUNTER — ANCILLARY PROCEDURE (OUTPATIENT)
Dept: MAMMOGRAPHY | Facility: CLINIC | Age: OVER 89
End: 2025-08-12
Attending: NURSE PRACTITIONER
Payer: COMMERCIAL

## 2025-08-12 DIAGNOSIS — N64.4 BREAST PAIN, RIGHT: ICD-10-CM

## 2025-08-12 PROCEDURE — 76642 ULTRASOUND BREAST LIMITED: CPT | Mod: 50

## 2025-08-12 PROCEDURE — 77062 BREAST TOMOSYNTHESIS BI: CPT

## 2025-08-19 ENCOUNTER — ANCILLARY PROCEDURE (OUTPATIENT)
Dept: MAMMOGRAPHY | Facility: CLINIC | Age: OVER 89
End: 2025-08-19
Attending: NURSE PRACTITIONER
Payer: COMMERCIAL

## 2025-08-19 DIAGNOSIS — N64.4 BREAST PAIN, RIGHT: ICD-10-CM

## 2025-08-19 PROCEDURE — 999N000065 MA POST PROCEDURE LEFT

## 2025-08-21 ENCOUNTER — TELEPHONE (OUTPATIENT)
Dept: MAMMOGRAPHY | Facility: CLINIC | Age: OVER 89
End: 2025-08-21
Payer: COMMERCIAL

## 2025-09-03 ENCOUNTER — PATIENT OUTREACH (OUTPATIENT)
Dept: ONCOLOGY | Facility: CLINIC | Age: OVER 89
End: 2025-09-03

## 2025-09-03 ENCOUNTER — OFFICE VISIT (OUTPATIENT)
Dept: SURGERY | Facility: CLINIC | Age: OVER 89
End: 2025-09-03
Attending: SURGERY
Payer: COMMERCIAL

## 2025-09-03 VITALS — HEIGHT: 60 IN | BODY MASS INDEX: 24.74 KG/M2 | WEIGHT: 126 LBS

## 2025-09-03 DIAGNOSIS — C50.912 INVASIVE LOBULAR CARCINOMA OF LEFT BREAST IN FEMALE (H): Primary | ICD-10-CM

## 2025-09-03 PROCEDURE — 99244 OFF/OP CNSLTJ NEW/EST MOD 40: CPT | Performed by: SURGERY

## 2025-09-03 PROCEDURE — 99212 OFFICE O/P EST SF 10 MIN: CPT | Performed by: SURGERY

## 2025-09-03 RX ORDER — LATANOPROST 50 UG/ML
SOLUTION/ DROPS OPHTHALMIC
COMMUNITY
Start: 2025-07-23